# Patient Record
Sex: MALE | Race: WHITE | NOT HISPANIC OR LATINO | Employment: STUDENT | URBAN - METROPOLITAN AREA
[De-identification: names, ages, dates, MRNs, and addresses within clinical notes are randomized per-mention and may not be internally consistent; named-entity substitution may affect disease eponyms.]

---
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
## 2020-01-09 ENCOUNTER — HOSPITAL ENCOUNTER (EMERGENCY)
Facility: HOSPITAL | Age: 20
Discharge: HOME | End: 2020-01-09
Attending: EMERGENCY MEDICINE
Payer: COMMERCIAL

## 2020-01-09 ENCOUNTER — APPOINTMENT (EMERGENCY)
Dept: RADIOLOGY | Facility: HOSPITAL | Age: 20
End: 2020-01-09
Payer: COMMERCIAL

## 2020-01-09 VITALS
HEART RATE: 67 BPM | WEIGHT: 133 LBS | DIASTOLIC BLOOD PRESSURE: 80 MMHG | OXYGEN SATURATION: 99 % | TEMPERATURE: 98.5 F | RESPIRATION RATE: 16 BRPM | SYSTOLIC BLOOD PRESSURE: 135 MMHG

## 2020-01-09 DIAGNOSIS — M54.9 ACUTE BILATERAL BACK PAIN, UNSPECIFIED BACK LOCATION: ICD-10-CM
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
## 2020-01-09 DIAGNOSIS — S09.90XA INJURY OF HEAD, INITIAL ENCOUNTER: ICD-10-CM

## 2020-01-09 PROCEDURE — 99283 EMERGENCY DEPT VISIT LOW MDM: CPT

## 2020-01-09 PROCEDURE — 90471 IMMUNIZATION ADMIN: CPT

## 2020-01-09 PROCEDURE — 96372 THER/PROPH/DIAG INJ SC/IM: CPT

## 2020-01-09 PROCEDURE — 71046 X-RAY EXAM CHEST 2 VIEWS: CPT

## 2020-01-09 PROCEDURE — 90715 TDAP VACCINE 7 YRS/> IM: CPT | Performed by: EMERGENCY MEDICINE

## 2020-01-09 PROCEDURE — 99284 EMERGENCY DEPT VISIT MOD MDM: CPT | Performed by: EMERGENCY MEDICINE

## 2020-01-09 RX ORDER — KETOROLAC TROMETHAMINE 30 MG/ML
30 INJECTION, SOLUTION INTRAMUSCULAR; INTRAVENOUS ONCE
Status: COMPLETED | OUTPATIENT
Start: 2020-01-09 | End: 2020-01-09

## 2020-01-09 RX ORDER — NAPROXEN 500 MG/1
500 TABLET ORAL 2 TIMES DAILY WITH MEALS
Qty: 30 TABLET | Refills: 0 | Status: SHIPPED | OUTPATIENT
Start: 2020-01-09 | End: 2021-04-17

## 2020-01-09 RX ADMIN — KETOROLAC TROMETHAMINE 30 MG: 30 INJECTION, SOLUTION INTRAMUSCULAR at 12:37

## 2020-01-09 RX ADMIN — TETANUS TOXOID, REDUCED DIPHTHERIA TOXOID AND ACELLULAR PERTUSSIS VACCINE, ADSORBED 0.5 ML: 5; 2.5; 8; 8; 2.5 SUSPENSION INTRAMUSCULAR at 12:37

## 2020-01-09 NOTE — DISCHARGE INSTRUCTIONS
Please make sure to follow up with a primary care doctor within 1 week if you continue to have symptoms and still have pain  You can take anti-inflammatories for your muscle pains and apply heat packs to affected areas  If you develop any leg weakness, numbness or tingling, inability to walk, urinary/fecal incontinence/retention, or any other concerning symptoms, please return to the emergency department as these could be signs of worsening illness

## 2021-01-13 ENCOUNTER — TRANSCRIBE ORDERS (OUTPATIENT)
Dept: FAMILY MEDICINE CLINIC | Facility: HOSPITAL | Age: 21
End: 2021-01-13

## 2021-01-13 ENCOUNTER — APPOINTMENT (OUTPATIENT)
Dept: LAB | Facility: CLINIC | Age: 21
End: 2021-01-13
Payer: OTHER GOVERNMENT

## 2021-01-13 ENCOUNTER — OFFICE VISIT (OUTPATIENT)
Dept: DERMATOLOGY | Facility: CLINIC | Age: 21
End: 2021-01-13
Payer: COMMERCIAL

## 2021-01-13 VITALS — TEMPERATURE: 97.1 F | BODY MASS INDEX: 21.83 KG/M2 | WEIGHT: 131 LBS | HEIGHT: 65 IN

## 2021-01-13 DIAGNOSIS — L72.0 EPIDERMAL CYST: ICD-10-CM

## 2021-01-13 DIAGNOSIS — B35.1 ONYCHOMYCOSIS: Primary | ICD-10-CM

## 2021-01-13 DIAGNOSIS — L70.0 ACNE VULGARIS: ICD-10-CM

## 2021-01-13 LAB
ALBUMIN SERPL BCP-MCNC: 3.8 G/DL (ref 3.5–5)
ALP SERPL-CCNC: 68 U/L (ref 46–116)
ALT SERPL W P-5'-P-CCNC: 24 U/L (ref 12–78)
AST SERPL W P-5'-P-CCNC: 16 U/L (ref 5–45)
BILIRUB DIRECT SERPL-MCNC: 0.08 MG/DL (ref 0–0.2)
BILIRUB SERPL-MCNC: 0.2 MG/DL (ref 0.2–1)
PROT SERPL-MCNC: 7 G/DL (ref 6.4–8.2)

## 2021-01-13 PROCEDURE — 87102 FUNGUS ISOLATION CULTURE: CPT | Performed by: STUDENT IN AN ORGANIZED HEALTH CARE EDUCATION/TRAINING PROGRAM

## 2021-01-13 PROCEDURE — 36415 COLL VENOUS BLD VENIPUNCTURE: CPT | Performed by: STUDENT IN AN ORGANIZED HEALTH CARE EDUCATION/TRAINING PROGRAM

## 2021-01-13 PROCEDURE — 80076 HEPATIC FUNCTION PANEL: CPT | Performed by: STUDENT IN AN ORGANIZED HEALTH CARE EDUCATION/TRAINING PROGRAM

## 2021-01-13 PROCEDURE — 99204 OFFICE O/P NEW MOD 45 MIN: CPT | Performed by: STUDENT IN AN ORGANIZED HEALTH CARE EDUCATION/TRAINING PROGRAM

## 2021-01-13 RX ORDER — TERBINAFINE HYDROCHLORIDE 250 MG/1
TABLET ORAL
Qty: 42 TABLET | Refills: 1 | Status: SHIPPED | OUTPATIENT
Start: 2021-01-13 | End: 2021-01-26

## 2021-01-13 NOTE — PATIENT INSTRUCTIONS
1  ONYCHOMYCOSIS ("FUNGAL NAIL")        Assessment and Plan:  Based on a thorough discussion of this condition and the management approach to it (including a comprehensive discussion of the known risks, side effects and potential benefits of treatment), the patient (family) agrees to implement the following specific plan:   Discussed with patient different options of treatments like oral medications different topicals, laser therapy and powders if patient is interested   Recommend patient to keep toe nail short   Will order labs for patient to get prior starting oral medication and the labs should be done again in 6 weeks   Start using prescribed terbinafine 250mg tablet  Take one pill daily with full glass of water   Recommend patient to follow up in 3 months for check up  What are fungal nail infections? Fungal infection of the nails is also known as onychomycosis  It is increasingly common with increased age  It rarely affects children  Which organisms cause onychomycosis? Onychomycosis can be due to:  Dermatophytes such as Trichophyton rubrum (T  rubrum), T  interdigitale (tinea unguium)   Yeasts such as Candida albicans   Molds such as Scopulariopsis brevicaulis and Fusarium species  What are the clinical features of onychomycosis? Onychomycosis may affect one or more toenails and fingernails and most often involves the great toenail or the little toenail  It can present in one or several different patterns   Lateral onychomycosis: a white or yellow opaque streak appears at one side of the nail   Subungual hyperkeratosis: scaling occurs under the nail   Distal onycholysis: the end of the nail lifts  The free edge often crumbles   Superficial white onychomycosis: flaky white patches and pits appear on the top of the nail plate   Proximal onychomycosis:yellow spots appear in the half-moon (lunula)      Onychoma or dermatophytoma:a thick localized area of infection in the nail plate    Destruction of the nail    Tinea unguium often results from untreated tinea pedis (feet) or tinea manuum (hand)  It may follow an injury to the nail or inflammatory disease of the nail  Candida infection of the nail plate generally results from paronychia and starts near the nail fold (the cuticle)  The nail fold is swollen and red, lifted off the nail plate  White, yellow, green or black marks appear on the nearby nail and spread  The nail may lift off its bed and is tender if you press on it  Mold infections are similar in appearance to tinea unguium  Onychomycosis must be distinguished from other nail disorders   Bacterial infection especially Pseudomonas aeruginosa, which turns the nail black or green    Psoriasis    Eczema or dermatitis    Lichen planus    Viral warts    Onycholysis    Onychogryphosis (nail thickening and scaling under the nail), common in the elderly    How is the diagnosis of onychomycosis confirmed? Clippings should be taken from crumbling tissue at the end of the infected nail  The discolored surface of the nails can be scraped off  The debris can be scooped out from under the nail  The clippings and scrapings are sent to a mycology laboratory for microscopy and culture  Previous treatment can reduce the chance of growing the fungus successfully in a culture, so it is best to take the clippings before any treatment is commenced:   To confirm the diagnosis -- antifungal treatment will not be successful if there is another explanation for the nail condition    To identify the responsible organism  Molds and yeasts may require different treatment from dermatophyte fungi   Treatment may be required for a prolonged period and is expensive  Partially treated infection may be impossible to prove for many months as antifungal drugs can be detected even a year later  A nail biopsy may also reveal characteristic histopathological features of onychomycosis      What is the treatment of onychomycosis? Fingernail infections are usually cured more quickly and effectively than toenail infections  Mild infections affecting less than 50% of one or two nails may respond to topical antifungal medications, but cure usually requires an oral antifungal medication for several months  Devices used to treat onychomycosis  Recently, non-drug treatment has been developed to treat onychomycosis thus avoiding the side effects and risks of oral antifungal drugs  Lasers emitting infrared radiation are thought to kill fungi by the production of heat within the infected tissue  Laser treatment is reported to safely eradicate nail fungi with one to three, almost painless, sessions  Several lasers have been approved for this purpose by the FDA and other regulatory authorities  However, high-quality studies of efficacy are lacking, and existing studies indicate that laser treatment is less medically effective than topical or oral antifungal agents  Nd:YAG continuous, long or short-pulsed lasers   Ti:Sapphire modelocked laser   Diode laser          2  EPIDERMAL INCLUSION CYST        Assessment and Plan:  Based on a thorough discussion of this condition and the management approach to it (including a comprehensive discussion of the known risks, side effects and potential benefits of treatment), the patient (family) agrees to implement the following specific plan:   Discussed with patient today about having it excised in the future if interested  What are epidermal inclusion cysts? Epidermal inclusion cysts are the most common, benign cutaneous cysts  There are many different names for epidermal inclusion cysts, including epidermoid cyst, epidermal cyst, infundibular cyst, inclusion cyst, and keratin cyst  These cysts can occur anywhere on the body and typically present as nodules directly underneath the skin  There is often a visible pore or opening in the center   The cysts are freely moveable and can range from a few millimeters to several centimeters in diameter  The center of epidermoid cysts almost always contains keratin, which has a cheesy appearance, and not sebum  They also do not originate from sebaceous glands  Therefore, epidermal inclusion cysts are not the same as sebaceous cysts  Cysts may remain stable or progressively enlarge over time  There are no reliable predictive factors to tell if an epidermal inclusion cyst will enlarge, become inflamed, or remain quiescent  Infected cysts tend to become larger, turn red, and are more noticeable to the patient  There may be accompanying pain and discomfort  What causes epidermal inclusion cysts? Epidermal inclusion cysts often appear out of the blue and are not contagious  They are due to a proliferation of epidermal cells within the dermis and are more common in men than women  They occur more frequently in patients in their 20s to 45s  Epidermal inclusion cysts by themselves are usually not inherited, but they can be hereditary in rare syndromes such as Pittman syndrome, nodular elastosis with cysts and comedones (Favre-Racouchot syndrome), and basal cell nevus syndrome (Gorlin syndrome)  Elderly patients with chronic sun-damaged skin areas have a higher likelihood of developing epidermoid cysts  They often occur in areas where hair follicles have been inflamed or repeatedly irritated are more frequent in patients with acne vulgaris  In the  period, they are called milia  Patients on BRAF inhibitors such as imiquimod and cyclosporine have a higher incidence of epidermoid cysts of the face      How do we diagnose an epidermal inclusion cyst?  Epidermoid inclusion cysts are often diagnosed by history and physical exam  There is usually no need for biopsy prior to removal   Radiographic and laboratory exams, such as ultrasound studies, are unnecessary and not typically ordered unless the practitioner suspects a genetic condition  What is the treatment for an epidermal inclusion cyst?  Inflamed, uninfected epidermal inclusion cysts rarely resolve spontaneously without therapy or surgical intervention  Treatment is not emergent unless desired by the patient  Definitive treatment is via surgical excision with walls intact  This method will prevent recurrence  This is best done when the cyst is not inflamed, to decrease the probability of rupture during surgery  - A local anesthetic will be injected around the cyst  - A small incision is made in the skin overlying the cyst, and contents are expressed  - The incision is repaired with sutures    Another option is to use a 4mm punch biopsy with cyst extraction through the defect  Incision and drainage is often needed if the cyst is infected or inflamed  If there is surrounding cellulitis, oral antibiotic therapy may be necessary  The common agents used target methicillin sensitive Staphylococcal aureus and methicillin resistant S aureus in areas of high prevalence

## 2021-01-13 NOTE — PROGRESS NOTES
[address] [facility] Note     Patient Name: Amira Singleton  Encounter Date: 01/13/2021     Have you been cared for by a [facility] Dermatologist in the last 3 years and, if so, which one? No    · Have you traveled outside of the [address] [place] [address] in the past 3 months or outside of the [facility] area in the last 2 weeks? No     May we call your Preferred Phone number to discuss your specific medical information? Yes     May we leave a detailed message that includes your specific medical information? Yes      Today's Chief Concerns:   Concern #1:  Nail fungus    Concern #2: Acne     Past Medical History:  Have you personally ever had or currently have any of the following? · Skin cancer (such as Melanoma, Basal Cell Carcinoma, Squamous Cell Carcinoma? (If Yes, please provide more detail)- No  · Eczema: No  · Psoriasis: No  · HIV/AIDS: No  · Hepatitis B or C: No  · Tuberculosis: No  · Systemic Immunosuppression such as Diabetes, Biologic or Immunotherapy, Chemotherapy, Organ Transplantation, Bone Marrow Transplantation (If YES, please provide more detail): No  · Radiation Treatment (If YES, please provide more detail): No  · Any other major medical conditions/concerns? (If Yes, which types)- YES, a arotic stenosis     Social History:     What is/was your primary occupation? n/a     What are your hobbies/past-times? Golfing     Family History:  Have any of your "first degree relatives" (parent, brother, sister, or child) had any of the following       · Skin cancer such as Melanoma or Merkel Cell Carcinoma or Pancreatic Cancer? No  · Eczema, Asthma, Hay Fever or Seasonal Allergies: YES, seasonal  allergies   · Psoriasis or Psoriatic Arthritis: No  · Do any other medical conditions seem to run in your family? If Yes, what condition and which relatives?   YES, lukemia - cousin    Current Medications:   (please update all dermatological medications before printing patient's AVS!)      Current Outpatient Medications:     fluocinonide (LIDEX) 0 05 % cream, Apply 1 application topically 2 (two) times a day for 7 days, Disp: 30 g, Rfl: 0    naproxen (NAPROSYN) 500 mg tablet, Take 1 tablet (500 mg total) by mouth 2 (two) times a day with meals (Patient not taking: Reported on 1/13/2021), Disp: 30 tablet, Rfl: 0    predniSONE 10 mg tablet, Take 6 tabs PO X2d, followed by 5 tabs PO X2d, 4 tabs PO X2d, 3 tabs PO X2d, 2 tabs PO X2d & 1 tab PO X2d (Patient not taking: Reported on 1/13/2021), Disp: 42 tablet, Rfl: 0      Review of Systems:  Have you recently had or currently have any of the following? If YES, what are you doing for the problem? · Fever, chills or unintended weight loss: No  · Sudden loss or change in your vision: No  · Nausea, vomiting or blood in your stool: No  · Painful or swollen joints: No  · Wheezing or cough: No  · Changing mole or non-healing wound: YES, cyst on back area present for years  · Nosebleeds: No  · Excessive sweating: No  · Easy or prolonged bleeding? YES,   · Over the last 2 weeks, how often have you been bothered by the following problems? · Taking little interest or pleasure in doing things: 1 - Not at All  · Feeling down, depressed, or hopeless: 1 - Not at All  · Rapid heartbeat with epinephrine:  No        · Any known allergies? No Known Allergies      Physical Exam:     Was a chaperone (Derm Clinical Assistant) present throughout the entire Physical Exam? Yes     Did the Dermatology Team specifically  the patient on the importance of a Full Skin Exam to be sure that nothing is missed clinically?  Yes}  o Did the patient ultimately request or accept a Full Skin Exam?  NO  o Did the patient specifically refuse to have the areas "under-the-bra" examined by the Dermatologist? Jomar lopez Did the patient specifically refuse to have the areas "under-the-underwear" examined by the Dermatologist? YES    CONSTITUTIONAL:   Vitals:    01/13/21 0851 Temp: (!) 97 1 °F (36 2 °C)   TempSrc: Tympanic   Weight: 59 4 kg (131 lb)   Height: 5' 5" (1 651 m)           PSYCH: Normal mood and affect  EYES: Normal conjunctiva  ENT: Normal lips and oral mucosa  CARDIOVASCULAR: No edema  RESPIRATORY: Normal respirations  HEME/LYMPH/IMMUNO:  No regional lymphadenopathy except as noted below in "ASSESSMENT AND PLAN BY DIAGNOSIS"    SKIN:  FOCUSED ORGAN SYSTEM EXAM   Hair, Face Normal except as noted below in Assessment   Neck Normal except as noted below in Assessment   Groin/Genitalia/Buttocks   Right Foot, Toes   Left  Foot, Toes    NOT EXAMINED   Normal except as noted below in Assessment   Normal except as noted below in Assessment          Assessment and Plan by Diagnosis:    History of Present Condition:     Duration:  How long has this been an issue for you?    o  2 years    Location Affected:  Where on the body is this affecting you?    o  left foot    Quality:  Is there any bleeding, pain, itch, burning/irritation, or redness associated with the skin lesion? o  some pain from walking long period of times   Severity:  Describe any bleeding, pain, itch, burning/irritation, or redness on a scale of 1 to 10 (with 10 being the worst)  o  n/a   Timing:  Does this condition seem to be there pretty constantly or do you notice it more at specific times throughout the day? o  constantly there    Context:  Have you ever noticed that this condition seems to be associated with specific activities you do?     o  wearing sneakers for long periods - hiking and walking long periods     Modifying Factors:    o Anything that seems to make the condition worse?    - wearing sneakers for long periods - hiking and walking long periods*   o What have you tried to do to make the condition better?    -  white vinegar, fluconazole topical solution,      Associated Signs and Symptoms:  Does this skin lesion seem to be associated with any of the following:  o  Slight pain 1  ONYCHOMYCOSIS ("FUNGAL NAIL")    Physical Exam:   Anatomic Location Affected:  Left foot, toe #1-3    Morphological Description:  Dystrophic nails with hyperkeratotic debris    Pertinent Positives:   Pertinent Negatives: no e/o tinea pedis    Additional History of Present Condition:  Present for years  Patient has tried in the past topical cream and home remedies  Currently using ciclopirox  Asks about other available treatments  Has had foot fungus as well in past, resolved with topicals OTC  Assessment and Plan:  Based on a thorough discussion of this condition and the management approach to it (including a comprehensive discussion of the known risks, side effects and potential benefits of treatment), the patient (family) agrees to implement the following specific plan:   Discussed with patient different options of treatments   Will plan to start terbinafine 250mg tablet, Take one pill daily with full glass of water  For 12 week course   Discussed expected timeline of growth of toenails and expected improvemnt with treatment   Will order labs for patient to get prior to starting oral medication and then patient should repeat labs in 6 weeks-    Obtained a fungal nail culture today   Patient to follow up in 3 months for check up  What are fungal nail infections? Fungal infection of the nails is also known as onychomycosis  It is increasingly common with increased age  It rarely affects children  Which organisms cause onychomycosis? Onychomycosis can be due to:  Dermatophytes such as Trichophyton rubrum (T  rubrum), T  interdigitale (tinea unguium)   Yeasts such as Candida albicans   Molds such as Scopulariopsis brevicaulis and Fusarium species  What are the clinical features of onychomycosis? Onychomycosis may affect one or more toenails and fingernails and most often involves the great toenail or the little toenail   It can present in one or several different patterns   Lateral onychomycosis: a white or yellow opaque streak appears at one side of the nail   Subungual hyperkeratosis: scaling occurs under the nail   Distal onycholysis: the end of the nail lifts  The free edge often crumbles   Superficial white onychomycosis: flaky white patches and pits appear on the top of the nail plate   Proximal onychomycosis:yellow spots appear in the half-moon (lunula)   Onychoma or dermatophytoma:a thick localized area of infection in the nail plate    Destruction of the nail    Tinea unguium often results from untreated tinea pedis (feet) or tinea manuum (hand)  It may follow an injury to the nail or inflammatory disease of the nail  Candida infection of the nail plate generally results from paronychia and starts near the nail fold (the cuticle)  The nail fold is swollen and red, lifted off the nail plate  White, yellow, green or black marks appear on the nearby nail and spread  The nail may lift off its bed and is tender if you press on it  Mold infections are similar in appearance to tinea unguium  Onychomycosis must be distinguished from other nail disorders   Bacterial infection especially Pseudomonas aeruginosa, which turns the nail black or green    Psoriasis    Eczema or dermatitis    Lichen planus    Viral warts    Onycholysis    Onychogryphosis (nail thickening and scaling under the nail), common in the elderly    How is the diagnosis of onychomycosis confirmed? Clippings should be taken from crumbling tissue at the end of the infected nail  The discolored surface of the nails can be scraped off  The debris can be scooped out from under the nail  The clippings and scrapings are sent to a mycology laboratory for microscopy and culture    Previous treatment can reduce the chance of growing the fungus successfully in a culture, so it is best to take the clippings before any treatment is commenced:   To confirm the diagnosis -- antifungal treatment will not be successful if there is another explanation for the nail condition    To identify the responsible organism  Molds and yeasts may require different treatment from dermatophyte fungi   Treatment may be required for a prolonged period and is expensive  Partially treated infection may be impossible to prove for many months as antifungal drugs can be detected even a year later  A nail biopsy may also reveal characteristic histopathological features of onychomycosis  What is the treatment of onychomycosis? Fingernail infections are usually cured more quickly and effectively than toenail infections  Mild infections affecting less than 50% of one or two nails may respond to topical antifungal medications, but cure usually requires an oral antifungal medication for several months  Devices used to treat onychomycosis  Recently, non-drug treatment has been developed to treat onychomycosis thus avoiding the side effects and risks of oral antifungal drugs  Lasers emitting infrared radiation are thought to kill fungi by the production of heat within the infected tissue  Laser treatment is reported to safely eradicate nail fungi with one to three, almost painless, sessions  Several lasers have been approved for this purpose by the FDA and other regulatory authorities  However, high-quality studies of efficacy are lacking, and existing studies indicate that laser treatment is less medically effective than topical or oral antifungal agents  Nd:YAG continuous, long or short-pulsed lasers   Ti:Sapphire modelocked laser   Diode laser          2  EPIDERMAL CYST, pilonidal cyst given location     Physical Exam:   Anatomic Location Affected:  Upper gluteal cleft   Morphological Description:  6mm pink to skin colored papule, mobile    Pertinent Positives:   Pertinent Negatives: No visible scarred tracks    Additional History of Present Condition:  Present for years   Patient notes he get pain on lower back whenever he does sit ups  Has never gotten large, swollen, does not drain  Assessment and Plan:  Based on a thorough discussion of this condition and the management approach to it (including a comprehensive discussion of the known risks, side effects and potential benefits of treatment), the patient (family) agrees to implement the following specific plan:   Given size, discussed with patient today about having it excised with us in the future if interested   Please call office to schedule excision with Dr Brandon Wilson either in [place] or [facility]  What are epidermal inclusion cysts? Epidermal inclusion cysts are the most common, benign cutaneous cysts  There are many different names for epidermal inclusion cysts, including epidermoid cyst, epidermal cyst, infundibular cyst, inclusion cyst, and keratin cyst  These cysts can occur anywhere on the body and typically present as nodules directly underneath the skin  There is often a visible pore or opening in the center  The cysts are freely moveable and can range from a few millimeters to several centimeters in diameter  The center of epidermoid cysts almost always contains keratin, which has a cheesy appearance, and not sebum  They also do not originate from sebaceous glands  Therefore, epidermal inclusion cysts are not the same as sebaceous cysts  Cysts may remain stable or progressively enlarge over time  There are no reliable predictive factors to tell if an epidermal inclusion cyst will enlarge, become inflamed, or remain quiescent  Infected cysts tend to become larger, turn red, and are more noticeable to the patient  There may be accompanying pain and discomfort  What causes epidermal inclusion cysts? Epidermal inclusion cysts often appear out of the blue and are not contagious  They are due to a proliferation of epidermal cells within the dermis and are more common in men than women   They occur more frequently in patients in their 25s to 45s  Epidermal inclusion cysts by themselves are usually not inherited, but they can be hereditary in rare syndromes such as Pittman syndrome, nodular elastosis with cysts and comedones (Favre-Racouchot syndrome), and basal cell nevus syndrome (Gorlin syndrome)  Elderly patients with chronic sun-damaged skin areas have a higher likelihood of developing epidermoid cysts  They often occur in areas where hair follicles have been inflamed or repeatedly irritated are more frequent in patients with acne vulgaris  In the  period, they are called milia  Patients on BRAF inhibitors such as imiquimod and cyclosporine have a higher incidence of epidermoid cysts of the face  How do we diagnose an epidermal inclusion cyst?  Epidermoid inclusion cysts are often diagnosed by history and physical exam  There is usually no need for biopsy prior to removal   Radiographic and laboratory exams, such as ultrasound studies, are unnecessary and not typically ordered unless the practitioner suspects a genetic condition  What is the treatment for an epidermal inclusion cyst?  Inflamed, uninfected epidermal inclusion cysts rarely resolve spontaneously without therapy or surgical intervention  Treatment is not emergent unless desired by the patient  Definitive treatment is via surgical excision with walls intact  This method will prevent recurrence  This is best done when the cyst is not inflamed, to decrease the probability of rupture during surgery  - A local anesthetic will be injected around the cyst  - A small incision is made in the skin overlying the cyst, and contents are expressed  - The incision is repaired with sutures    Another option is to use a 4mm punch biopsy with cyst extraction through the defect  Incision and drainage is often needed if the cyst is infected or inflamed  If there is surrounding cellulitis, oral antibiotic therapy may be necessary   The common agents used target methicillin sensitive Staphylococcal aureus and methicillin resistant S aureus in areas of high prevalence  ACNE VULGARIS ("COMMON ACNE")    Physical Exam:   Anatomic Location Affected: face   Morphological Description: few small inflammatory papules    Additional History of Present Condition:  Pt has used benzaclin in past and recently ran out was doing well on this and asks for refill    Assessment and Plan:   We reviewed the causes of acne, the kinds of acne, and the expected clinical course   We discussed treatment options ranging from over-the-counter products, topical retinoids, antibiotics, BP, hormonal therapies (OCPs/spironolactone), and isotretinoin (Accutane)   We reviewed specific over-the-counter interventions and medications  Recommended typical hygiene measures washing regularly with mild cleanser, and refraining from picking and popping any pimples  Recommended non-comedogenic sunscreen use daily   Expectations of therapy discussed  Side effects, risks and benefits of medications discussed  A comprehensive handout with treatment plan provided  The phone number to call in case of questions or concerns (and instructions to stop medications in such a scenario) was provided   After lengthy discussion of etiology and treatment options, we decided to implement the following personalized treatment plan:      Mornin  Wash with over the counter Cerave cleanser  2  Apply BENZACLIN lotion to entire affected area   3  Follow with an oil-free sunscreen (SPF 30 or higher)  Some options include Neutrogena oil-free moisturizer with SPF     Night:    1  Wash your face with a gentle face wash - like Cetaphil wash, Cerave Hydrating , LaRoche Hydrating Cleanser   2  Apply an oil free moisturizer     Make sure all products you use on face are labeled as "non-comedongenic" or "oil-free" or " does not clog pores"  Acne can be frustrating and difficult to treat   Most acne regimens take 2-3 months to see an improvement, so stick with them  Don't give up! As always, call your doctor if you have any concerns about your medications              Scribe Attestation    I,:  Maria R Cota MA am acting as a scribe while in the presence of the attending physician :       I,:  Leonor Jaimes MD personally performed the services described in this documentation    as scribed in my presence :

## 2021-01-13 NOTE — RESULT ENCOUNTER NOTE
Called patient to advise of normal results and okay to start terbinafine 250 mg daily  Will recheck labs in 6 weeks

## 2021-01-26 ENCOUNTER — TELEPHONE (OUTPATIENT)
Dept: DERMATOLOGY | Facility: CLINIC | Age: 21
End: 2021-01-26

## 2021-01-26 RX ORDER — CLINDAMYCIN AND BENZOYL PEROXIDE 10; 50 MG/G; MG/G
GEL TOPICAL 2 TIMES DAILY
Qty: 50 G | Refills: 5 | Status: SHIPPED | OUTPATIENT
Start: 2021-01-26 | End: 2021-04-17

## 2021-01-26 RX ORDER — TERBINAFINE HYDROCHLORIDE 250 MG/1
250 TABLET ORAL DAILY
Qty: 54 TABLET | Refills: 0 | Status: SHIPPED | OUTPATIENT
Start: 2021-01-26 | End: 2021-02-22

## 2021-01-26 RX ORDER — CLINDAMYCIN AND BENZOYL PEROXIDE 10; 50 MG/G; MG/G
GEL TOPICAL 2 TIMES DAILY
Qty: 50 G | Refills: 5 | Status: SHIPPED | OUTPATIENT
Start: 2021-01-26 | End: 2021-01-26

## 2021-01-26 NOTE — TELEPHONE ENCOUNTER
Called pt to discuss  Pt was only given 30 pills of terbinafine instead of 42 (which was to be 1/2 way through course) Confused about labs and medications  Sent new Rx to new pharmacy as pt requested for 54 tablets that he needs to finish out 84 days of medication  Instructed he needs labs at day 43 (1/2 way through course)  He voiced understanding  Rx's sent under initial encounter date

## 2021-02-15 LAB — FUNGUS SPEC CULT: NORMAL

## 2021-02-19 ENCOUNTER — TELEPHONE (OUTPATIENT)
Dept: OTHER | Facility: OTHER | Age: 21
End: 2021-02-19

## 2021-02-22 RX ORDER — TERBINAFINE HYDROCHLORIDE 250 MG/1
250 TABLET ORAL DAILY
Qty: 30 TABLET | Refills: 0 | Status: SHIPPED | OUTPATIENT
Start: 2021-02-22 | End: 2021-03-24

## 2021-02-22 NOTE — TELEPHONE ENCOUNTER
Called to discuss- he notes he is able to get it Thursday  Oka to continue terbinafine  Again notes pharmacy couldn't fill 42 tablets- sent 30 additional tablets to complete 12 weeks course

## 2021-02-24 NOTE — TELEPHONE ENCOUNTER
Thank you  I started [employer] today for clindamycin phos-Benzoyl Perox 1-5% Gel  LVM notifying him once it's approved patient will be called

## 2021-02-26 DIAGNOSIS — L70.0 ACNE VULGARIS: Primary | ICD-10-CM

## 2021-02-26 RX ORDER — CLINDAMYCIN PHOSPHATE AND BENZOYL PEROXIDE 10; 50 MG/G; MG/G
GEL TOPICAL
Qty: 45 G | Refills: 6 | Status: CANCELLED | OUTPATIENT
Start: 2021-02-26

## 2021-02-26 NOTE — TELEPHONE ENCOUNTER
PA was denied  Insurance suggest to try [facility]) clindamycin- benzol peroxide 1 2 - 5% Gel first    Please review order before signing         Patient's VM is full, not able to notify patient of denial

## 2021-03-23 ENCOUNTER — TELEPHONE (OUTPATIENT)
Dept: DERMATOLOGY | Facility: CLINIC | Age: 21
End: 2021-03-23

## 2021-04-17 ENCOUNTER — APPOINTMENT (EMERGENCY)
Dept: ULTRASOUND IMAGING | Facility: HOSPITAL | Age: 21
End: 2021-04-17
Payer: OTHER GOVERNMENT

## 2021-04-17 ENCOUNTER — APPOINTMENT (EMERGENCY)
Dept: RADIOLOGY | Facility: HOSPITAL | Age: 21
End: 2021-04-17
Payer: OTHER GOVERNMENT

## 2021-04-17 ENCOUNTER — HOSPITAL ENCOUNTER (EMERGENCY)
Facility: HOSPITAL | Age: 21
Discharge: HOME | End: 2021-04-17
Attending: EMERGENCY MEDICINE | Admitting: EMERGENCY MEDICINE
Payer: OTHER GOVERNMENT

## 2021-04-17 VITALS
WEIGHT: 130 LBS | HEART RATE: 63 BPM | DIASTOLIC BLOOD PRESSURE: 65 MMHG | BODY MASS INDEX: 20.89 KG/M2 | SYSTOLIC BLOOD PRESSURE: 119 MMHG | HEIGHT: 66 IN | OXYGEN SATURATION: 99 % | RESPIRATION RATE: 16 BRPM | TEMPERATURE: 98.5 F

## 2021-04-17 DIAGNOSIS — R19.09 INGUINAL SWELLING: Primary | ICD-10-CM

## 2021-04-17 PROCEDURE — 99284 EMERGENCY DEPT VISIT MOD MDM: CPT | Performed by: EMERGENCY MEDICINE

## 2021-04-17 PROCEDURE — 73130 X-RAY EXAM OF HAND: CPT

## 2021-04-17 PROCEDURE — 93971 EXTREMITY STUDY: CPT

## 2021-04-17 PROCEDURE — 99284 EMERGENCY DEPT VISIT MOD MDM: CPT

## 2021-04-17 RX ORDER — TERBINAFINE HYDROCHLORIDE 250 MG/1
250 TABLET ORAL DAILY
COMMUNITY
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
## 2021-04-18 PROCEDURE — 93971 EXTREMITY STUDY: CPT | Performed by: SURGERY

## 2021-05-24 ENCOUNTER — OFFICE VISIT (OUTPATIENT)
Dept: DERMATOLOGY | Facility: CLINIC | Age: 21
End: 2021-05-24
Payer: OTHER GOVERNMENT

## 2021-05-24 VITALS — TEMPERATURE: 98.6 F | BODY MASS INDEX: 20.9 KG/M2 | WEIGHT: 130.07 LBS | HEIGHT: 66 IN

## 2021-05-24 DIAGNOSIS — L70.0 ACNE VULGARIS: Primary | ICD-10-CM

## 2021-05-24 DIAGNOSIS — B35.1 ONYCHOMYCOSIS: ICD-10-CM

## 2021-05-24 PROCEDURE — 99213 OFFICE O/P EST LOW 20 MIN: CPT | Performed by: STUDENT IN AN ORGANIZED HEALTH CARE EDUCATION/TRAINING PROGRAM

## 2021-05-24 NOTE — PATIENT INSTRUCTIONS
ACNE VULGARIS ("COMMON ACNE")  POST INFLAMMATORY ERYTHMA AND HYPERPIGMENTATION    Assessment and Plan:   We reviewed the causes of acne, the kinds of acne, and the expected clinical course   We discussed treatment options ranging from over-the-counter products, topical retinoids, antibiotics, BP, hormonal therapies (OCPs/spironolactone), and isotretinoin (Accutane)   We reviewed specific over-the-counter interventions and medications  Recommended typical hygiene measures washing regularly with mild cleanser, and refraining from picking and popping any pimples  Recommended non-comedogenic sunscreen use daily   Expectations of therapy discussed  Side effects, risks and benefits of medications discussed  A comprehensive handout with treatment plan provided  The phone number to call in case of questions or concerns (and instructions to stop medications in such a scenario) was provided   Counseled patient on treatment options  After lengthy discussion of etiology and treatment options, we decided to implement/continue the following personalized treatment plan  All adjustments from prior treatments highlighted below   Discussed fading away of post inflammatory erythema and hyperpigmentation and that good sun protection and topical tretinoin would help with this  Mornin  Wash your face with a gentle face wash - like Cetaphil wash, Cerave Hydrating , LaRoche Hydrating Cleanser   2  Continue Benzaclin lotion to entire affected area   3  Follow with an oil-free sunscreen (SPF 30 or higher)  Some options include Neutrogena oil-free moisturizer with SPF         Night:    1  Wash your face with a gentle face wash - like Cetaphil wash, Cerave Hydrating , LaRoche Hydrating Cleanser   2  (New medication) Pat dry your face, wait 15 mins and then apply a pea-sized amount of over the counter Differin Gel or LaRoche Adapalene 0 1% gel - an even thin layer on your face  Can be drying   Start by using every other night and then build it up to every night  Avoid the eye area and the curves around your nose and corners of your lips  - If too irritating, keep using spaced out to every other night or every 3rd night and slowly increase frequency of use to nightly  Can also use on back and chest if you have these areas involved with acne  - If you were prescribed a prescription strength version and this is not covered by   3  Follow with an oil free moisturizer on top of this medicine to combat the dryness  Homeopathic Options:   Can trial over the counter Supplements for inflammatory acne include Zinc- 40mg daily OR Niacinamide 500mg twice daily  There is possible belly upset a/w these  Make sure all products you use on face are labeled as "non-comedongenic" or "oil-free" or " does not clog pores"  Acne can be frustrating and difficult to treat  Most acne regimens take 2-3 months to see an improvement, so stick with them  Don't give up! As always, call your doctor if you have any concerns about your medications  Assessment and Plan:  Based on a thorough discussion of this condition and the management approach to it (including a comprehensive discussion of the known risks, side effects and potential benefits of treatment), the patient (family) agrees to implement the following specific plan:   You can start to cut your nail with nail clippers  Cut the nails first with no  toenail fungus, then Clean the nail clipper with alcohol before cutting the big toe  ·  Start to use over the counter anitfungal powder Zeasorb         Follow up in 3 months; Patient will call around mid July/end of July  What are fungal nail infections? Fungal infection of the nails is also known as onychomycosis  It is increasingly common with increased age  It rarely affects children  Which organisms cause onychomycosis?   Onychomycosis can be due to:  Dermatophytes such as Trichophyton rubrum (T  rubrum), T  interdigitale (tinea unguium)   Yeasts such as Candida albicans   Molds such as Scopulariopsis brevicaulis and Fusarium species  What are the clinical features of onychomycosis? Onychomycosis may affect one or more toenails and fingernails and most often involves the great toenail or the little toenail  It can present in one or several different patterns   Lateral onychomycosis: a white or yellow opaque streak appears at one side of the nail   Subungual hyperkeratosis: scaling occurs under the nail   Distal onycholysis: the end of the nail lifts  The free edge often crumbles   Superficial white onychomycosis: flaky white patches and pits appear on the top of the nail plate   Proximal onychomycosis:yellow spots appear in the half-moon (lunula)   Onychoma or dermatophytoma:a thick localized area of infection in the nail plate    Destruction of the nail    Tinea unguium often results from untreated tinea pedis (feet) or tinea manuum (hand)  It may follow an injury to the nail or inflammatory disease of the nail  Candida infection of the nail plate generally results from paronychia and starts near the nail fold (the cuticle)  The nail fold is swollen and red, lifted off the nail plate  White, yellow, green or black marks appear on the nearby nail and spread  The nail may lift off its bed and is tender if you press on it  Mold infections are similar in appearance to tinea unguium  Onychomycosis must be distinguished from other nail disorders   Bacterial infection especially Pseudomonas aeruginosa, which turns the nail black or green    Psoriasis    Eczema or dermatitis    Lichen planus    Viral warts    Onycholysis    Onychogryphosis (nail thickening and scaling under the nail), common in the elderly    How is the diagnosis of onychomycosis confirmed? Clippings should be taken from crumbling tissue at the end of the infected nail  The discolored surface of the nails can be scraped off  The debris can be scooped out from under the nail  The clippings and scrapings are sent to a mycology laboratory for microscopy and culture  Previous treatment can reduce the chance of growing the fungus successfully in a culture, so it is best to take the clippings before any treatment is commenced:   To confirm the diagnosis -- antifungal treatment will not be successful if there is another explanation for the nail condition    To identify the responsible organism  Molds and yeasts may require different treatment from dermatophyte fungi   Treatment may be required for a prolonged period and is expensive  Partially treated infection may be impossible to prove for many months as antifungal drugs can be detected even a year later  A nail biopsy may also reveal characteristic histopathological features of onychomycosis  What is the treatment of onychomycosis? Fingernail infections are usually cured more quickly and effectively than toenail infections  Mild infections affecting less than 50% of one or two nails may respond to topical antifungal medications, but cure usually requires an oral antifungal medication for several months  Devices used to treat onychomycosis  Recently, non-drug treatment has been developed to treat onychomycosis thus avoiding the side effects and risks of oral antifungal drugs  Lasers emitting infrared radiation are thought to kill fungi by the production of heat within the infected tissue  Laser treatment is reported to safely eradicate nail fungi with one to three, almost painless, sessions  Several lasers have been approved for this purpose by the FDA and other regulatory authorities  However, high-quality studies of efficacy are lacking, and existing studies indicate that laser treatment is less medically effective than topical or oral antifungal agents    Nd:YAG continuous, long or short-pulsed lasers   Ti:Sapphire modelocked laser   Diode laser

## 2021-05-24 NOTE — PROGRESS NOTES
[facility] Follow Up Note    Patient Name: Alexa Mckeon  Encounter Date: 5/24/2021    Today's Chief Concerns:  [facility] Concern #1:  Follow up fungus on toe   Concern #2: Acne       Current Medications:    Current Outpatient Medications:     fluocinonide (LIDEX) 0 05 % cream, Apply 1 application topically 2 (two) times a day for 7 days, Disp: 30 g, Rfl: 0    terbinafine (LamISIL) 250 mg tablet, Take 250 mg by mouth daily, Disp: , Rfl:     CONSTITUTIONAL:   There were no vitals filed for this visit  Specific Alerts:    Have you been seen by a [facility] Dermatologist in the last 3 years? YES      No Known Allergies    May we call your Preferred Phone number to discuss your specific medical information? YES    May we leave a detailed message that includes your specific medical information? YES    Have you traveled outside of the [facility] in the past 3 months? No    Do you currently have a pacemaker or defibrillator? No    Do you have any artificial heart valves, joints, plates, screws, rods, stents, pins, etc? No   - If Yes, were any placed within the last 2 years? Do you require any medications prior to a surgical procedure? No    Are you taking any medications that cause you to bleed more easily ("blood thinners") No    Have you ever experienced a rapid heartbeat with epinephrine? No    Have you ever been treated with "gold" (gold sodium thiomalate) therapy? No    [facility] can help with wrinkles, "laugh lines," facial volume loss, "double chin," "love handles," age spots, and more  Are you interested in learning today about some of the skin enhancement procedures that we offer? (If Yes, please provide more detail) No    Review of Systems:  Have you recently had or currently have any of the following?     · Fever or chills: No  · Night Sweats: No  · Headaches: No  · Weight Gain: No  · Weight Loss: No  · Blurry Vision: No  · Nausea: No  · Vomiting: No  · Diarrhea: No  · Blood in Stool: No  · Abdominal Pain: No  · Itchy Skin: No  · Painful Joints: No  · Swollen Joints: No  · Muscle Pain: No  · Irregular Mole: No  · Sun Burn: YES  · Dry Skin: YES  · Skin Color Changes: No  · Scar or Keloid: No  · Cold Sores/Fever Blisters: No  · Bacterial Infections/MRSA: No  · Anxiety: No  · Depression: No  · Suicidal or Homicidal Thoughts: No      PSYCH: Normal mood and affect  EYES: Normal conjunctiva  ENT: Normal lips and oral mucosa  CARDIOVASCULAR: No edema  RESPIRATORY: Normal respirations  HEME/LYMPH/IMMUNO:  No regional lymphadenopathy except as noted below in ASSESSMENT AND PLAN BY DIAGNOSIS    SKIN:  FOCUSED ORGAN SYSTEM EXAM   Hair, Ears, Face Normal except as noted below in Assessment   Neck, Chest, Back Normal except as noted below in Assessment     Right Leg, Foot, Toes Normal except as noted below in Assessment   Left Leg, Foot, Toes Normal except as noted below in Assessment     1  ONYCHOMYCOSIS ("FUNGAL NAIL")    Physical Exam:   Anatomic Location Affected: Left first great toe   Morphological Description:  Healthy nail proximally with distal dystrophic roseann plate     Additional History of Present Condition:  Present for years  Patient had tried in the past topical cream and home remedies  Was given terbinafine 250 mg and completed ~3month course; Patient states today that the toe fungus has gotten a lot better  Assessment and Plan:  Based on a thorough discussion of this condition and the management approach to it (including a comprehensive discussion of the known risks, side effects and potential benefits of treatment), the patient (family) agrees to implement the following specific plan:   Reassured can start to cut your nail with nail clippers  Advised to clean the nail clipper with alcohol after using    ·  Start to use over the counter anitfungal powder Zeasorb or OTC terbinafine cream (lamisil) to prevent re-infection  · Discussed slow growth of toenails but should expect contd healthy growth       Follow up in 3 months for acne, can also look at toenails; Patient will call around mid July/end of July  What are fungal nail infections? Fungal infection of the nails is also known as onychomycosis  It is increasingly common with increased age  It rarely affects children  Which organisms cause onychomycosis? Onychomycosis can be due to:  Dermatophytes such as Trichophyton rubrum (T  rubrum), T  interdigitale (tinea unguium)   Yeasts such as Candida albicans   Molds such as Scopulariopsis brevicaulis and Fusarium species  What are the clinical features of onychomycosis? Onychomycosis may affect one or more toenails and fingernails and most often involves the great toenail or the little toenail  It can present in one or several different patterns   Lateral onychomycosis: a white or yellow opaque streak appears at one side of the nail   Subungual hyperkeratosis: scaling occurs under the nail   Distal onycholysis: the end of the nail lifts  The free edge often crumbles   Superficial white onychomycosis: flaky white patches and pits appear on the top of the nail plate   Proximal onychomycosis:yellow spots appear in the half-moon (lunula)   Onychoma or dermatophytoma:a thick localized area of infection in the nail plate    Destruction of the nail    Tinea unguium often results from untreated tinea pedis (feet) or tinea manuum (hand)  It may follow an injury to the nail or inflammatory disease of the nail  Candida infection of the nail plate generally results from paronychia and starts near the nail fold (the cuticle)  The nail fold is swollen and red, lifted off the nail plate  White, yellow, green or black marks appear on the nearby nail and spread  The nail may lift off its bed and is tender if you press on it  Mold infections are similar in appearance to tinea unguium    Onychomycosis must be distinguished from other nail disorders   Bacterial infection especially Pseudomonas aeruginosa, which turns the nail black or green    Psoriasis    Eczema or dermatitis    Lichen planus    Viral warts    Onycholysis    Onychogryphosis (nail thickening and scaling under the nail), common in the elderly    How is the diagnosis of onychomycosis confirmed? Clippings should be taken from crumbling tissue at the end of the infected nail  The discolored surface of the nails can be scraped off  The debris can be scooped out from under the nail  The clippings and scrapings are sent to a mycology laboratory for microscopy and culture  Previous treatment can reduce the chance of growing the fungus successfully in a culture, so it is best to take the clippings before any treatment is commenced:   To confirm the diagnosis -- antifungal treatment will not be successful if there is another explanation for the nail condition    To identify the responsible organism  Molds and yeasts may require different treatment from dermatophyte fungi   Treatment may be required for a prolonged period and is expensive  Partially treated infection may be impossible to prove for many months as antifungal drugs can be detected even a year later  A nail biopsy may also reveal characteristic histopathological features of onychomycosis  What is the treatment of onychomycosis? Fingernail infections are usually cured more quickly and effectively than toenail infections  Mild infections affecting less than 50% of one or two nails may respond to topical antifungal medications, but cure usually requires an oral antifungal medication for several months  Devices used to treat onychomycosis  Recently, non-drug treatment has been developed to treat onychomycosis thus avoiding the side effects and risks of oral antifungal drugs  Lasers emitting infrared radiation are thought to kill fungi by the production of heat within the infected tissue   Laser treatment is reported to safely eradicate nail fungi with one to three, almost painless, sessions  Several lasers have been approved for this purpose by the FDA and other regulatory authorities  However, high-quality studies of efficacy are lacking, and existing studies indicate that laser treatment is less medically effective than topical or oral antifungal agents  Nd:YAG continuous, long or short-pulsed lasers   Ti:Sapphire modelocked laser   Diode laser    2  ACNE VULGARIS ("COMMON ACNE")  POST INFLAMMATORY ERYTHMA AND HYPERPIGMENTATION    Physical Exam:   Anatomic Location Affected & Morphological Description:  face,  with scattered open/closed comedones, inflammatory papules, pustules, and nodules with erythematous and tan macules within affected areas    Additional History of Present Condition:  Present for several years  Patient notes he get pain on lower back whenever he does sit up  Has never gotten large, swollen, does not drain  Assessment and Plan:   We reviewed the causes of acne, the kinds of acne, and the expected clinical course   We discussed treatment options ranging from over-the-counter products, topical retinoids, antibiotics, BP, hormonal therapies (OCPs/spironolactone), and isotretinoin (Accutane)   We reviewed specific over-the-counter interventions and medications  Recommended typical hygiene measures washing regularly with mild cleanser, and refraining from picking and popping any pimples  Recommended non-comedogenic sunscreen use daily   Expectations of therapy discussed  Side effects, risks and benefits of medications discussed  A comprehensive handout with treatment plan provided  The phone number to call in case of questions or concerns (and instructions to stop medications in such a scenario) was provided   Counseled patient on treatment options  After lengthy discussion of etiology and treatment options, we decided to implement/continue the following personalized treatment plan   All adjustments from prior treatments highlighted below   Discussed fading away of post inflammatory erythema and hyperpigmentation and that good sun protection and topical tretinoin would help with this  Mornin  Wash your face with a gentle face wash - like Cetaphil wash, Cerave Hydrating , LaRoche Hydrating Cleanser   2  Continue Benzaclin lotion to entire affected area (Pt previously using this and likes it)  3  Follow with an oil-free sunscreen (SPF 30 or higher)  Some options include Neutrogena oil-free moisturizer with SPF         Night:    1  Wash your face with a gentle face wash - like Cetaphil wash, Cerave Hydrating , LaRoche Hydrating Cleanser   2  (New medication) Pat dry your face, wait 15 mins and then apply a pea-sized amount of over the counter Differin Gel or LaRoche Adapalene 0 1% gel - an even thin layer on your face  Can be drying  Start by using every other night and then build it up to every night  Avoid the eye area and the curves around your nose and corners of your lips  - If too irritating, keep using spaced out to every other night or every 3rd night and slowly increase frequency of use to nightly  Can also use on back and chest if you have these areas involved with acne  - If you were prescribed a prescription strength version and this is not covered by   3  Follow with an oil free moisturizer on top of this medicine to combat the dryness  Homeopathic Options:   Can trial over the counter Supplements for inflammatory acne include Zinc- 40mg daily OR Niacinamide 500mg twice daily  There is possible belly upset a/w these  Make sure all products you use on face are labeled as "non-comedongenic" or "oil-free" or " does not clog pores"  Acne can be frustrating and difficult to treat  Most acne regimens take 2-3 months to see an improvement, so stick with them  Don't give up!  As always, call your doctor if you have any concerns about your medications        Scribe Attestation    I,:  Jesus Whalen am acting as a scribe while in the presence of the attending physician :       I,:  Lalita Hyde MD personally performed the services described in this documentation    as scribed in my presence :

## 2021-08-04 ENCOUNTER — TELEPHONE (OUTPATIENT)
Dept: DERMATOLOGY | Facility: CLINIC | Age: 21
End: 2021-08-04

## 2023-02-17 ENCOUNTER — APPOINTMENT (EMERGENCY)
Dept: RADIOLOGY | Facility: HOSPITAL | Age: 23
End: 2023-02-17

## 2023-02-17 ENCOUNTER — HOSPITAL ENCOUNTER (EMERGENCY)
Facility: HOSPITAL | Age: 23
Discharge: HOME | End: 2023-02-17
Attending: EMERGENCY MEDICINE

## 2023-02-17 VITALS
HEIGHT: 66 IN | WEIGHT: 125 LBS | BODY MASS INDEX: 20.09 KG/M2 | DIASTOLIC BLOOD PRESSURE: 64 MMHG | RESPIRATION RATE: 16 BRPM | OXYGEN SATURATION: 100 % | TEMPERATURE: 97.3 F | SYSTOLIC BLOOD PRESSURE: 115 MMHG | HEART RATE: 64 BPM

## 2023-02-17 DIAGNOSIS — S40.012A CONTUSION OF LEFT SHOULDER, INITIAL ENCOUNTER: Primary | ICD-10-CM

## 2023-02-18 NOTE — ED PROVIDER NOTES
History  Chief Complaint   Patient presents with   • Shoulder Injury     Pt presents w L shoulder pain after a fall playing basketball last Friday     24year-old male presented for evaluation of left shoulder pain  Patient reported about a week ago while playing basketball, he made a wrong move, fell and hit his left shoulder to the ground  Patient reported feeling pain but the pain was tolerable enough to continue playing  The next day after the game, patient woke up with worsening pain in the left shoulder as well as some bruising  Patient stated he applied some ice to the area but did not take any pain meds  He was able to do his daily activity without substantial pain   Patient reports he went to play basketball again today and when he released the ball to score a three-point shot, he fell as if something tore in his shoulder reason why he presented to the ED tonight  Patient is seen at bedside in no acute distress but  reports pain with placement of his arm on top of his head  Prior to Admission Medications   Prescriptions Last Dose Informant Patient Reported? Taking?   fluocinonide (LIDEX) 0 05 % cream   No No   Sig: Apply 1 application topically 2 (two) times a day for 7 days   terbinafine (LamISIL) 250 mg tablet   Yes No   Sig: Take 250 mg by mouth daily      Facility-Administered Medications: None       Past Medical History:   Diagnosis Date   • Aortic stenosis        Past Surgical History:   Procedure Laterality Date   • CARDIAC SURGERY     • EYE SURGERY      x3   • MYRINGOTOMY     • TESTICLE SURGERY      1 removed   • THUMB FUSION      with 2 pins placed       History reviewed  No pertinent family history  I have reviewed and agree with the history as documented      E-Cigarette/Vaping   • E-Cigarette Use Never User      E-Cigarette/Vaping Substances   • Nicotine No    • THC No    • CBD No    • Flavoring No    • Other No    • Unknown No      Social History     Tobacco Use   • Smoking status: Former     Types: Cigarettes   • Smokeless tobacco: Never   Vaping Use   • Vaping Use: Never used   Substance Use Topics   • Alcohol use: Yes     Comment: drinks 2-3 per week    • Drug use: Yes     Types: Marijuana     Comment: ocassionally         Review of Systems   Constitutional: Negative for chills and fever  HENT: Negative for ear pain and sore throat  Eyes: Negative for pain and visual disturbance  Respiratory: Negative for cough and shortness of breath  Cardiovascular: Negative for chest pain and palpitations  Gastrointestinal: Negative for abdominal pain and vomiting  Genitourinary: Negative for dysuria and hematuria  Musculoskeletal: Positive for arthralgias (left shoulder)  Negative for back pain  Skin: Negative for color change and rash  Neurological: Negative for seizures and syncope  All other systems reviewed and are negative  Physical Exam  ED Triage Vitals   Temperature Pulse Respirations Blood Pressure SpO2   02/17/23 2108 02/17/23 2108 02/17/23 2108 02/17/23 2108 02/17/23 2108   (!) 97 3 °F (36 3 °C) 64 16 115/64 100 %      Temp Source Heart Rate Source Patient Position - Orthostatic VS BP Location FiO2 (%)   02/17/23 2108 02/17/23 2108 02/17/23 2108 02/17/23 2108 --   Oral Monitor Sitting Right arm       Pain Score       02/17/23 2119       6             Orthostatic Vital Signs  Vitals:    02/17/23 2108   BP: 115/64   Pulse: 64   Patient Position - Orthostatic VS: Sitting       Physical Exam  Vitals and nursing note reviewed  Constitutional:       General: He is not in acute distress  Appearance: He is well-developed  HENT:      Head: Normocephalic and atraumatic  Eyes:      Conjunctiva/sclera: Conjunctivae normal    Cardiovascular:      Rate and Rhythm: Normal rate and regular rhythm  Heart sounds: No murmur heard  Pulmonary:      Effort: Pulmonary effort is normal  No respiratory distress  Breath sounds: Normal breath sounds     Abdominal: Palpations: Abdomen is soft  Tenderness: There is no abdominal tenderness  Musculoskeletal:         General: Tenderness present  No swelling or deformity  Arms:       Cervical back: Neck supple  Comments: Tenderness present with palpation of the left acromioclavicular and shoulder joints  There is intact sensation in the arm and fingers  Skin:     General: Skin is warm and dry  Capillary Refill: Capillary refill takes less than 2 seconds  Neurological:      General: No focal deficit present  Mental Status: He is alert  Sensory: No sensory deficit  Psychiatric:         Mood and Affect: Mood normal          ED Medications  Medications - No data to display    Diagnostic Studies  Results Reviewed     None                 XR shoulder 2+ views LEFT    (Results Pending)         Procedures  Procedures      ED Course  ED Course as of 02/17/23 2320 Fri Feb 17, 2023   258 50-year-old male presenting for evaluation of left shoulder pain after he sustained an injury playing basketball last Friday 2238 We ordered a shoulder x-ray  Patient offered pain meds but declined  2315 XR shoulder 2+ views LEFT  X-ray of the shoulder showed no signs of dislocation or fracture  Patient was given an ice pack in the emergency room and was discharged with a referral to sports medicine  Return precautions discussed  Medical Decision Making  50-year-old male presenting for evaluation of left shoulder pain after he sustained an injury playing basketball last Friday  We ordered a shoulder x-ray  Patient offered pain meds but declined  Differential diagnoses include shoulder sprain, cervical tendinitis, shoulder strain, shoulder fracture    X-ray of the shoulder showed no signs of dislocation or fracture  Patient was given an ice pack in the emergency room and was discharged with a referral to sports medicine    Return precautions discussed  Contusion of left shoulder, initial encounter: complicated acute illness or injury  Amount and/or Complexity of Data Reviewed  Radiology: ordered  Decision-making details documented in ED Course  Disposition  Final diagnoses:   Contusion of left shoulder, initial encounter     Time reflects when diagnosis was documented in both MDM as applicable and the Disposition within this note     Time User Action Codes Description Comment    2/17/2023 10:44 PM Myranda Stone Add [S40 012A] Contusion of left shoulder, initial encounter       ED Disposition     ED Disposition   Discharge    Condition   Stable    Date/Time   Fri Feb 17, 2023 10:44 PM    Comment   [facility] discharge to home/self care  Follow-up Information     Follow up With Specialties Details Why [facility], MD Pediatrics   [address]      Onur Tong MD Sports Medicine, Orthopedic Surgery   [address] [place] [address]  [phone]            Discharge Medication List as of 2/17/2023 10:54 PM      CONTINUE these medications which have NOT CHANGED    Details   fluocinonide (LIDEX) 0 05 % cream Apply 1 application topically 2 (two) times a day for 7 days, Starting 4/23/2017, Until Sun 4/30/17, Print      terbinafine (LamISIL) 250 mg tablet Take 250 mg by mouth daily, Historical Med           No discharge procedures on file  PDMP Review     None           ED Provider  Attending physically available and evaluated Verna Marley I managed the patient along with the ED Attending      Electronically Signed by         Fadia Landeros MD  02/17/23 5391

## 2023-02-18 NOTE — ED NOTES
XR at bedside      [facility], [address] [place] [address]  02/17/23 6350

## 2023-02-18 NOTE — ED ATTENDING ATTESTATION
2/17/2023  IJacque DO, saw and evaluated the patient  I have discussed the patient with the resident/non-physician practitioner and agree with the resident's/non-physician practitioner's findings, Plan of Care, and MDM as documented in the resident's/non-physician practitioner's note, except where noted  All available labs and Radiology studies were reviewed  I was present for key portions of any procedure(s) performed by the resident/non-physician practitioner and I was immediately available to provide assistance  At this point I agree with the current assessment done in the Emergency Department  I have conducted an independent evaluation of this patient a history and physical is as follows:    Patient is a 26-year-old male with a history of aortic stenosis status post cardiac surgery who presents with left shoulder pain  Patient states that he was playing basketball about a week ago  He tripped and fell onto his left shoulder  He states that it hurt but he was able to continue to play basketball  He did not have significant pain throughout the week  However he attempted to play basketball this evening and had worsening of his left shoulder pain when attempting a jump shot  He states that pain radiates into the left bicep  He denies numbness, tingling, weakness, neck pain or other concerns  He has not taken anything for pain at home  On exam, patient is in no acute distress  Heart is regular rate and rhythm  Breath sounds normal   Left shoulder is nontender to palpation  Full range of motion in left shoulder  Negative drop arm test  Pain with Vern's test   Distal extremity is warm and well-perfused  Motor, sensation normal     X-ray negative for fracture or dislocation  We will treat for left shoulder contusion versus strain  Recommend Tylenol, ice, rest and outpatient follow-up  Will refer to sports medicine    Patient stable for discharge and outpatient follow-up  Portions of the above record have been created with voice recognition software  Occasional wrong word or "sound alike" substitutions may have occurred due to the inherent limitations of voice recognition software  Read the chart carefully and recognize, using context, where substitutions may have occurred        ED Course         Critical Care Time  Procedures

## 2023-02-18 NOTE — DISCHARGE INSTRUCTIONS
Follow-up with your PCP    Call Dr Wagner [facility] office to to make an appointment  He is part of [facility]'s sports medicine team    Take Tylenol as needed for pain    Rest the shoulder, apply ice pack and try to move your shoulder as tolerated  Return sooner to the ED if you feel worse  5

## 2023-02-20 ENCOUNTER — TELEPHONE (OUTPATIENT)
Dept: OBGYN CLINIC | Facility: HOSPITAL | Age: 23
End: 2023-02-20

## 2023-02-22 ENCOUNTER — OFFICE VISIT (OUTPATIENT)
Dept: OBGYN CLINIC | Facility: MEDICAL CENTER | Age: 23
End: 2023-02-22

## 2023-02-22 VITALS
DIASTOLIC BLOOD PRESSURE: 80 MMHG | BODY MASS INDEX: 22.02 KG/M2 | SYSTOLIC BLOOD PRESSURE: 122 MMHG | HEIGHT: 66 IN | HEART RATE: 61 BPM | WEIGHT: 137 LBS

## 2023-02-22 DIAGNOSIS — S43.52XA SPRAIN OF ACROMIOCLAVICULAR JOINT, LEFT, INITIAL ENCOUNTER: Primary | ICD-10-CM

## 2023-02-22 NOTE — PROGRESS NOTES
Ortho Sports Medicine Shoulder Visit     Assesment:   left shoulder mild ac joint sprain, possible small labral tear     Plan:    Conservative treatment:    Ice to shoulder 1-2 times daily, for 20 minutes at a time  PT for ROM and strengthening to shoulder, rotator cuff, scapular stabilizers  Home exercise program for shoulder, including ROM and strenthening  Instructions given to patient of what exercises to perform  If no improvement in 6-8 weeks then would get MRI arthrogram to further evaluate left shoulder  Imaging: All imaging from today was reviewed by myself and explained to the patient  Injection:    No Injection planned at this time  Surgery:     No surgery is recommended at this point, continue with conservative treatment plan as noted  History of Present Illness: The patient is a 20 y o , right hand dominant male whose occupation is unknown, referred to me by the emergency room, seen in clinic for consultation of left shoulder pain  The patient denies a history of diabetes  The patient denies a history of thyroid disorder  Pain is located superior shoulder  The pain has been present for 2 weeks  The patient sustained an injury around 2 weeks ago  He fell onto left shoulder while playing basketball, arm tucked to his side  He has some pain but next day increase in pain  Seen at ED a week later imaging negative for fracture  He has full motion of shoulder but pain with overhead activities, reaching back and out to grab items  He states he does have history of clavicle fracture 9 years ago on left which healed with conservative treatment  The pain is characterized as sharp, dull, achy  The pain is present daily  Pain is improved by rest and NSAIDS  Pain is aggravated by overhead activity, reaching back, rotation and lifting   The patient denies weakness  The patient denies numbness and tingling  The patient has tried rest, ice and NSAIDS  Shoulder Surgical History:  None    Past Medical, Social and Family History:  Past Medical History:   Diagnosis Date   • Aortic stenosis      Past Surgical History:   Procedure Laterality Date   • CARDIAC SURGERY     • EYE SURGERY      x3   • MYRINGOTOMY     • TESTICLE SURGERY      1 removed   • THUMB FUSION      with 2 pins placed     No Known Allergies  Current Outpatient Medications on File Prior to Visit   Medication Sig Dispense Refill   • fluocinonide (LIDEX) 0 05 % cream Apply 1 application topically 2 (two) times a day for 7 days 30 g 0   • terbinafine (LamISIL) 250 mg tablet Take 250 mg by mouth daily (Patient not taking: Reported on 2/22/2023)       No current facility-administered medications on file prior to visit  Social History     Socioeconomic History   • Marital status: Single     Spouse name: Not on file   • Number of children: Not on file   • Years of education: Not on file   • Highest education level: Not on file   Occupational History   • Not on file   Tobacco Use   • Smoking status: Former     Types: Cigarettes   • Smokeless tobacco: Never   Vaping Use   • Vaping Use: Never used   Substance and Sexual Activity   • Alcohol use: Yes     Comment: drinks 2-3 per week    • Drug use: Yes     Types: Marijuana     Comment: ocassionally    • Sexual activity: Not on file   Other Topics Concern   • Not on file   Social History Narrative   • Not on file     Social Determinants of Health     Financial Resource Strain: Not on file   Food Insecurity: Not on file   Transportation Needs: Not on file   Physical Activity: Not on file   Stress: Not on file   Social Connections: Not on file   Intimate Partner Violence: Not on file   Housing Stability: Not on file         I have reviewed the past medical, surgical, social and family history, medications and allergies as documented in the EMR  Review of systems: ROS is negative other than that noted in the HPI    Constitutional: Negative for fatigue and fever    HENT: Negative for sore throat  Respiratory: Negative for shortness of breath  Cardiovascular: Negative for chest pain  Gastrointestinal: Negative for abdominal pain  Endocrine: Negative for cold intolerance and heat intolerance  Genitourinary: Negative for flank pain  Musculoskeletal: Negative for back pain  Skin: Negative for rash  Allergic/Immunologic: Negative for immunocompromised state  Neurological: Negative for dizziness  Psychiatric/Behavioral: Negative for agitation  Physical Exam:    Blood pressure 122/80, pulse 61, height 5' 6" (1 676 m), weight 62 1 kg (137 lb)  General/Constitutional: NAD, well developed, well nourished  HENT: Normocephalic, atraumatic  CV: Intact distal pulses, regular rate  Resp: No respiratory distress or labored breathing  Lymphatic: No lymphadenopathy palpated  Neuro: Alert and Oriented x 3, no focal deficits  Psych: Normal mood, normal affect, normal judgement, normal behavior  Skin: Warm, dry, no rashes, no erythema     Shoulder Exam (focused):     Shoulder focused exam:       RIGHT LEFT    Scapula Atrophy Negative Negative     Winging Negative Negative     Protraction Negative Negative    Rotator cuff SS 5/5 5/5     IS 5/5 5/5     SubS 5/5 5/5    ROM  170     ER0 60 60     ER90 90 90     IR90 40 40     IRb T6 T6    TTP: AC Negative Positive     Biceps Negative Negative     Coracoid Negative Negative    Special Tests: O'Briens Negative Negative     Hardwick-shear Negative Negative     Cross body Adduction Negative Negative     Speeds  Negative Positive     Vern's Negative Negative     Whipple Negative Negative       Neer Negative Negative     Fagan Negative Negative    Instability: Apprehension & relocation not tested not tested     Load & shift not tested not tested    Other: Crank Negative Negative               UE NV Exam: +2 Radial pulses bilaterally  Sensation intact to light touch C5-T1 bilaterally, Radial/median/ulnar nerve motor intact      Bilateral elbow, wrist, and and forearm ROM full, painless with passive ROM, no ttp or crepitance throughout extremities below shoulder joint    Cervical ROM is full without pain, numbness or tingling      Shoulder Imaging    X-rays of the left shoulder were reviewed, which demonstrate no acute fracture or dislocation  I have reviewed the radiology report and agree with their impression          Scribe Attestation    I,:  Randy Medeiros am acting as a scribe while in the presence of the attending physician :       I,:  Kadi Kurtz DO personally performed the services described in this documentation    as scribed in my presence :

## 2023-02-22 NOTE — LETTER
February 22, 2023     Ada Covington MD    Patient: Lisa Smith   YOB: 2000   Date of Visit: 2/22/2023       Dear Dr Ole Wang:    Thank you for referring Feroz Cain to me for evaluation  Below are my notes for this consultation  If you have questions, please do not hesitate to call me  I look forward to following your patient along with you           Sincerely,        Corine Jones, DO        CC: No Recipients

## 2023-03-30 ENCOUNTER — EVALUATION (OUTPATIENT)
Dept: PHYSICAL THERAPY | Facility: CLINIC | Age: 23
End: 2023-03-30

## 2023-03-30 DIAGNOSIS — S43.52XA SPRAIN OF ACROMIOCLAVICULAR JOINT, LEFT, INITIAL ENCOUNTER: ICD-10-CM

## 2023-03-30 NOTE — PROGRESS NOTES
PT Evaluation     Today's date: 3/30/2023  Patient name: Beatriz Chamorro  : 2000  MRN: [id number]  Referring provider: Ward Rodriguez DO  Dx:   Encounter Diagnosis     ICD-10-CM    1  Sprain of acromioclavicular joint, left, initial encounter  S43  52XA Ambulatory Referral to Physical Therapy                     Assessment  Assessment details: Beatriz Chamorro is a 20 y o  male with sprain of the L acromioclavicular joint  He presents with pain, decreased strength, decreased ROM, impaired sensation, and postural  dysfunction  Due to these impairments, Patient has difficulty performing a/iadls, recreational activities and engaging in social activities  Patient's clinical presentation is consistent with their referring diagnosis  Patient would benefit from skilled physical therapy to address their aforementioned impairments, improve their level of function and to improve their overall quality of life   has been given a home exercise program and is in agreement with the plan of care  Thank you for your referral   Impairments: abnormal or restricted ROM, impaired physical strength, lacks appropriate home exercise program and pain with function    Symptom irritability: lowUnderstanding of Dx/Px/POC: excellent  Goals  ST Goals - 2-4 weeks  1  Patient will report decreased pain with activity by at least 2 points within 4 weeks  2  Patient will improve ROM 5-10 degrees within 4 weeks  3  Patient will demonstrate ability to actively correct posture without cueing within 4 weeks  4  Patient will perform IADLs without pain in 2 weeks  5  Patient will increase strength by 25% in 4 weeks    LT Goals - Discharge  1  Patient will improve FOTO score to maximum stated or greater by discharge  2  Patient will return to preferred recreational activity without significant pain increase by discharge   3    Patient will return to all work related activities without pain by discharge     Plan  Patient would benefit from: skilled physical therapy  Referral necessary: No  Planned therapy interventions: joint mobilization, manual therapy, strengthening, stretching, therapeutic activities, therapeutic exercise and home exercise program  Frequency: 2x week  Duration in visits: 20  Duration in weeks: 20  Plan of Care beginning date: 3/30/2023  Plan of Care expiration date: 2023        Subjective Evaluation    History of Present Illness  Mechanism of injury: Approx 5-6 weeks ago patient was playing basketball, and fell on the posterior aspect of the L shoulder  He was able to continue playing but then went back the next week to play again and realized he couldn't move the shoulder as well  He felt like he tore something after that  He did wake up with neck pain in the last few days but that is possibly related to the sleeping position  CC:  Pain in the shoulder which is worse in the AM possibly related to sleeping position  Cross body and forward flexion are painful  Function:   He was working as a golf assistant  He is in between jobs but is looking for another job in that area  He has a [place] trip coming up in 3 weeks ()  He does also work out regularly  Recurrent probem    Quality of life: excellent    Pain  Current pain ratin  At best pain ratin  At worst pain ratin  Aggravating factors: overhead activity and lifting  Progression: improved    Hand dominance: right      Diagnostic Tests  X-ray: normal  Patient Goals  Patient goals for therapy: increased strength, independence with ADLs/IADLs, return to sport/leisure activities, return to work, increased motion and decreased pain          Objective     Tenderness     Left Shoulder   Tenderness in the [facility] [place] joint, acromion, bicipital groove and subacromial bursa       Cervical/Thoracic Screen   Cervical range of motion within normal limits  Thoracic range of motion within normal limits    Active Range of Motion   Left Shoulder   Flexion: 126 degrees with pain  Abduction: 110 degrees with pain  External rotation BTH: C7 with pain  Internal rotation BTB: lumbar with pain    Right Shoulder   Flexion: 150 degrees   Abduction: 160 degrees   External rotation BTH: WFL  Internal rotation BTB: WFL    Passive Range of Motion   Left Shoulder   Flexion: 162 degrees   Abduction: 170 degrees   External rotation 45°: 77 degrees   Internal rotation 45°: 70 degrees     Right Shoulder   External rotation 45°: 77 degrees   Internal rotation 45°: 70 degrees     Strength/Myotome Testing     Left Shoulder     Planes of Motion   Flexion: 5   Abduction: 5   External rotation BTH: 4   Internal rotation at 45°: 4+     Right Shoulder   Normal muscle strength    Tests     Left Shoulder   Positive crossover and Hawkin's                Precautions: na   [id number]         Manuals 3/30                                                                Neuro Re-Ed                                       Pulley nv            Table ER 5 x :20            Wall climb flexion / scap 5 x :10            pec doorway st 5 x :20                         Ther Ex                                                                                                                     Ther Activity                                       Gait Training                                       Modalities

## 2023-04-05 ENCOUNTER — OFFICE VISIT (OUTPATIENT)
Dept: PHYSICAL THERAPY | Facility: CLINIC | Age: 23
End: 2023-04-05

## 2023-04-05 DIAGNOSIS — S43.52XA SPRAIN OF ACROMIOCLAVICULAR JOINT, LEFT, INITIAL ENCOUNTER: Primary | ICD-10-CM

## 2023-04-05 NOTE — PROGRESS NOTES
Daily Note     Today's date: 2023  Patient name: Slick Conrad  : 2000  MRN: [id number]  Referring provider: Steve French DO  Dx:   Encounter Diagnosis     ICD-10-CM    1  Sprain of acromioclavicular joint, left, initial encounter  S43  52XA                      Subjective: Patient reports that he is feeling some anterior shoulder discomfort likely due to pec stretching  He also notices popping in the [facility] joint  Objective: See treatment diary below      Assessment: Tolerated treatment well  Patient would benefit from continued PT  Fatigue and weakness noted t/o the L UE  Given TB for home  Plan: Continue per plan of care        Precautions: na   [id number]         Manuals 3/30 4/5                                                               Neuro Re-Ed             TB row/ext  GTB x 20                        Pulley nv 3'           Table ER 5 x :20            Wall climb flexion / scap 5 x :10 5x:10           pec doorway st 5 x :20 5 x :20           S/l ER  3#/4# x 10 estuardo           S/l horiz abd  2# x 20           Prone Row  4# x20           Prone Ext   3#                                                                                         Ther Activity                                       Gait Training                                       Modalities

## 2023-04-24 ENCOUNTER — OFFICE VISIT (OUTPATIENT)
Dept: PHYSICAL THERAPY | Facility: CLINIC | Age: 23
End: 2023-04-24

## 2023-04-24 DIAGNOSIS — S43.52XA SPRAIN OF ACROMIOCLAVICULAR JOINT, LEFT, INITIAL ENCOUNTER: Primary | ICD-10-CM

## 2023-04-24 NOTE — PROGRESS NOTES
"Daily Note     Today's date: 2023  Patient name: Helen Doran  : 2000  MRN: [id number]  Referring provider: Marsha Cosby DO  Dx: No diagnosis found  Subjective: Patient states that he is challenged with wall slides at home  Objective: See treatment diary below      Assessment: Tolerated treatment well  Patient exhibited good technique with therapeutic exercises      Plan: Continue per plan of care        Precautions: na   [id number]         Manuals 3/30 4/5 4/13 4/17 4/19 4/24                                                           Neuro Re-Ed             B chaitanya rows   12# 2 x 10  12# 2 x 10  12# 2 x 10   12# 2 x 10        B chaitanya extension   10# 2 x 10  11 8# 2 x 10  12# 2 x 10  12# 2 x 10       TB row/ext  GTB x 20           Postural ER    RTB 10  RTB x 10  RTB x 10        Pulley nv 3' 3 3 4 4       Table ER 5 x :20            Wall climb flexion / scap 5 x :10 5x:10 5 x 10\"  10\" x 5  10\" x 5  10\" x 5        pec doorway st 5 x :20 5 x :20 5 x 20\" 5 x 20\"  5 x 20\" 5 x 20\"       S/l ER  3#/4# x 10 estuardo  3# 20  3# 20  3# 20        S/l horiz abd  2# x 20  2#20 3# 20  3# 20       Prone Row  4# x20 4# 20  4# 20 4# 20  43 25       Prone Ext   3# 3# 20  3# 20  3# 20  4# 20        Wall climbs   5x RTB 5 x RTB 5 laps RTB 2 x 5       Wall slides    10  10 5        UBE     3/3 3/3                                              Ther Activity                                       Gait Training                                       Modalities                                            "

## 2023-04-26 ENCOUNTER — OFFICE VISIT (OUTPATIENT)
Dept: PHYSICAL THERAPY | Facility: CLINIC | Age: 23
End: 2023-04-26

## 2023-04-26 DIAGNOSIS — S43.52XA SPRAIN OF ACROMIOCLAVICULAR JOINT, LEFT, INITIAL ENCOUNTER: Primary | ICD-10-CM

## 2023-04-26 NOTE — PROGRESS NOTES
"Daily Note     Today's date: 2023  Patient name: Keara Concepcion  : 2000  MRN: [id number]  Referring provider: Ubaldo Monsivais DO  Dx: No diagnosis found  Subjective: Patient states that he was sore/increase in pain following last treatment session      Objective: See treatment diary below      Assessment: Tolerated treatment well  Patient exhibited good technique with therapeutic exercises      Plan: Continue per plan of care        Precautions: na   [id number]         Manuals 3/30 4/5 4/13 4/17 4/19 4/24 4/26                                                          Neuro Re-Ed             B chaitanya rows   12# 2 x 10  12# 2 x 10  12# 2 x 10   12# 2 x 10  12 5# 3 x 10       B chaitanya extension   10# 2 x 10  11 8# 2 x 10  12# 2 x 10  12# 2 x 10 12 5# 3 x 10      TB row/ext  GTB x 20           Postural ER    RTB 10  RTB x 10  RTB x 10  RTb x 10       Pulley nv 3' 3 3 4 4 4      Table ER 5 x :20            Wall climb flexion / scap 5 x :10 5x:10 5 x 10\"  10\" x 5  10\" x 5  10\" x 5  10\"  x 5      pec doorway st 5 x :20 5 x :20 5 x 20\" 5 x 20\"  5 x 20\" 5 x 20\" 5 x 20\"       S/l ER  3#/4# x 10 estuardo  3# 20  3# 20  3# 20        S/l horiz abd  2# x 20  2#20 3# 20  3# 20 3# 20       Prone Row  4# x20 4# 20  4# 20 4# 20  4# 25 4# 30       Prone Ext   3# 3# 20  3# 20  3# 20  4# 20  4# 20       Prone rows with ER       2# 20       Wall climbs   5x RTB 5 x RTB 5 laps RTB 2 x 5       Wall slides    10  10 5        UBE     3/3 3/3 3/3                                             Ther Activity                                       Gait Training                                       Modalities                                            "

## 2023-05-01 ENCOUNTER — OFFICE VISIT (OUTPATIENT)
Dept: PHYSICAL THERAPY | Facility: CLINIC | Age: 23
End: 2023-05-01

## 2023-05-01 DIAGNOSIS — S43.52XA SPRAIN OF ACROMIOCLAVICULAR JOINT, LEFT, INITIAL ENCOUNTER: Primary | ICD-10-CM

## 2023-05-01 NOTE — PROGRESS NOTES
"Daily Note     Today's date: 2023  Patient name: Simona Dooley  : 2000  MRN: [id number]  Referring provider: Marvin Sharma DO  Dx: No diagnosis found  Subjective: Patient states that he has noticed an increase in soreness over the weekend  Encouraged compliance with HEP and altered pec stretch to supine position with better tolerance  Objective: See treatment diary below      Assessment: Tolerated treatment well  Patient exhibited good technique with therapeutic exercises      Plan: Continue per plan of care        Precautions: na   [id number]         Manuals 3/30 4/5 4/13 4/17 4/19 4/24 4/26 5/1                                                         Neuro Re-Ed             B chaitanya rows   12# 2 x 10  12# 2 x 10  12# 2 x 10   12# 2 x 10  12 5# 3 x 10  12 5# 2 x 10      B chaitanya extension   10# 2 x 10  11 8# 2 x 10  12# 2 x 10  12# 2 x 10 12 5# 3 x 10 12 5# 2 x 10      TB row/ext  GTB x 20           Postural ER    RTB 10  RTB x 10  RTB x 10  RTb x 10       Pulley nv 3' 3 3 4 4 4 5 min      Table ER 5 x :20            Wall climb flexion / scap 5 x :10 5x:10 5 x 10\"  10\" x 5  10\" x 5  10\" x 5  10\"  x 5 10\" x 5      pec doorway st 5 x :20 5 x :20 5 x 20\" 5 x 20\"  5 x 20\" 5 x 20\" 5 x 20\"  5 x 20\"      S/l ER  3#/4# x 10 estuardo  3# 20  3# 20  3# 20   3# 20      S/l horiz abd  2# x 20  2#20 3# 20  3# 20 3# 20       Prone Row  4# x20 4# 20  4# 20 4# 20  4# 25 4# 30       Prone Ext   3# 3# 20  3# 20  3# 20  4# 20  4# 20       Prone rows with ER       2# 20       Wall climbs   5x RTB 5 x RTB 5 laps RTB 2 x 5  held     Wall slides    10  10 5   held     UBE     3/3 3/3 3/3 3/3                                            Ther Activity                                       Gait Training                                       Modalities                                            "

## 2023-05-16 ENCOUNTER — HOSPITAL ENCOUNTER (OUTPATIENT)
Dept: MRI IMAGING | Facility: HOSPITAL | Age: 23
Discharge: HOME | End: 2023-05-16

## 2023-05-16 ENCOUNTER — HOSPITAL ENCOUNTER (OUTPATIENT)
Dept: RADIOLOGY | Facility: HOSPITAL | Age: 23
Discharge: HOME | End: 2023-05-16

## 2023-05-16 DIAGNOSIS — S43.432A SUPERIOR GLENOID LABRUM LESION OF LEFT SHOULDER, INITIAL ENCOUNTER: ICD-10-CM

## 2023-05-16 RX ORDER — LIDOCAINE HYDROCHLORIDE 10 MG/ML
4 INJECTION, SOLUTION EPIDURAL; INFILTRATION; INTRACAUDAL; PERINEURAL ONCE
Status: COMPLETED | OUTPATIENT
Start: 2023-05-16 | End: 2023-05-16

## 2023-05-16 RX ADMIN — IOHEXOL 1 ML: 300 INJECTION, SOLUTION INTRAVENOUS at 10:45

## 2023-05-16 RX ADMIN — GADOBUTROL 0.2 ML: 604.72 INJECTION INTRAVENOUS at 10:46

## 2023-05-16 RX ADMIN — LIDOCAINE HYDROCHLORIDE 4 ML: 10 INJECTION, SOLUTION EPIDURAL; INFILTRATION; INTRACAUDAL; PERINEURAL at 10:46

## 2023-06-02 ENCOUNTER — OFFICE VISIT (OUTPATIENT)
Dept: OBGYN CLINIC | Facility: MEDICAL CENTER | Age: 23
End: 2023-06-02

## 2023-06-02 VITALS
DIASTOLIC BLOOD PRESSURE: 77 MMHG | SYSTOLIC BLOOD PRESSURE: 115 MMHG | BODY MASS INDEX: 20.41 KG/M2 | HEIGHT: 66 IN | HEART RATE: 53 BPM | WEIGHT: 127 LBS

## 2023-06-02 DIAGNOSIS — S43.432A TEAR OF LEFT GLENOID LABRUM, INITIAL ENCOUNTER: ICD-10-CM

## 2023-06-02 DIAGNOSIS — S43.52XD SPRAIN OF ACROMIOCLAVICULAR JOINT, LEFT, SUBSEQUENT ENCOUNTER: Primary | ICD-10-CM

## 2023-06-02 NOTE — PROGRESS NOTES
Ortho Sports Medicine Shoulder Visit     Assesment:   left shoulder small labral tear 10-11 o'clock     Plan:    Conservative treatment:    Ice to shoulder 1-2 times daily, for 20 minutes at a time  MRI arthrogram reviewed today with patient  We discussed continued conservative vs surgical intervention  Continue with physical therapy and HEP, stretching, scapular stabilization  If symptoms persist then we can discuss arthroscopic labral repair vs cortisone injection intra articular  See back in 8 weeks       Imaging: All imaging from today was reviewed by myself and explained to the patient  Injection:    No Injection planned at this time  Surgery:     No surgery is recommended at this point, continue with conservative treatment plan as noted  History of Present Illness: The patient is returns for follow up of Left shoulder injury  He is here to review MRI arthrogram      He denies isma pain but states there are some motions that create an 'uncomfortable feeling'  No history shoulder dislocation  He states that with shoulder rotation he will experience a nonpainful click  He did 8 session of PT and has been doing light stretching  Avoiding overhead activities      Pain is improved by rest   Pain is aggravated by overhead activity and rotation  Symptoms include clicking  The patient denies weakness  The patient has tried rest, ice, NSAIDS and physical therapy  I have reviewed the past medical, surgical, social and family history, medications and allergies as documented in the EMR  Review of systems: ROS is negative other than that noted in the HPI  Constitutional: Negative for fatigue and fever     Cardiovascular: Negative for chest pain  Pulmonary: negative for shortness of breath    PMH/PSH:  Past Medical History:   Diagnosis Date   • Aortic stenosis      Past Surgical History:   Procedure Laterality Date   • CARDIAC SURGERY     • EYE SURGERY      x3   • [place] "INJECTION LEFT SHOULDER (ARTHROGRAM)  5/16/2023   • MYRINGOTOMY     • TESTICLE SURGERY      1 removed   • THUMB FUSION      with 2 pins placed        Physical Exam:    Blood pressure 115/77, pulse (!) 53, height 5' 6\" (1 676 m), weight 57 6 kg (127 lb)  General/Constitutional: NAD, well developed, well nourished  HENT: Normocephalic, atraumatic  CV: Intact distal pulses, regular rate  Resp: No respiratory distress or labored breathing  Lymphatic: No lymphadenopathy palpated  Neuro: Alert and Oriented x 3, no focal deficits  Psych: Normal mood, normal affect, normal judgement, normal behavior  Skin: Warm, dry, no rashes, no erythema     Shoulder Exam (focused): Shoulder focused exam:       RIGHT LEFT    Scapula Atrophy Negative Negative     Winging Negative Negative     Protraction Negative Negative    Rotator cuff SS 5/5 5/5     IS 5/5 5/5     SubS 5/5 5/5    ROM  170     ER0 60 60     ER90 90 90     IR90 40 40     IRb T6 T6    TTP: AC Negative Negative     Biceps Negative Negative     Coracoid Negative Negative    Special Tests: O'Briens Negative Negative     Hardwick-shear Negative Negative     Cross body Adduction Negative Negative     Speeds  Negative Negative     Vern's Negative Negative     Whipple Negative Negative       Neer Negative Negative     Fagan Negative Negative    Instability: Apprehension & relocation not tested not tested     Load & shift not tested not tested    Other: Crank Negative Negative               UE NV Exam: +2 Radial pulses bilaterally  Sensation intact to light touch C5-T1 bilaterally, Radial/median/ulnar nerve motor intact    Cervical ROM is full without pain, numbness or tingling      Shoulder Imaging    MRI of the left shoulder were reviewed, which demonstrate tiny labral tear from 10-11 o'clock position  I have reviewed the radiology report and agree with their impression        Scribe Attestation    I,:  Lakeisha Moreland am acting as a scribe while in the presence of the " attending physician :       I,:  Lindsay Garcia DO personally performed the services described in this documentation    as scribed in my presence :

## 2023-06-06 ENCOUNTER — EVALUATION (OUTPATIENT)
Dept: PHYSICAL THERAPY | Facility: CLINIC | Age: 23
End: 2023-06-06
Payer: COMMERCIAL

## 2023-06-06 DIAGNOSIS — S43.432A TEAR OF LEFT GLENOID LABRUM, INITIAL ENCOUNTER: Primary | ICD-10-CM

## 2023-06-06 PROCEDURE — 97110 THERAPEUTIC EXERCISES: CPT

## 2023-06-06 PROCEDURE — 97161 PT EVAL LOW COMPLEX 20 MIN: CPT

## 2023-06-06 PROCEDURE — 97112 NEUROMUSCULAR REEDUCATION: CPT

## 2023-06-06 NOTE — PROGRESS NOTES
PT Evaluation     Today's date: 2023  Patient name: Som Jacobson  : 2000  MRN: [id number]  Referring provider: Ted Pereira DO  Dx:   Encounter Diagnosis     ICD-10-CM    1  Tear of left glenoid labrum, initial encounter  S43 432A Ambulatory Referral to Physical Therapy                     Assessment  Assessment details: Bibi De La Cruz is a 23 yo male presenting with complaints of L shoulder pain with reaching/lifting  MRI revealing labral tear  Pt demonstrates pain and end range of abduction/external rotation, decreased UE weight bearing tolerance and poor neuromuscular control/stability leading to limitations with work related activities and ADLs  Pt would benefit from skilled physical therapy interventions in order to address the stated impairments, decrease pain with function and increase activity tolerance in order to improve quality of life  No further referral appears necessary at this time based on examination results  Prognosis is good given HEP compliance and PT 1-2/wk   Positive prognostic indicators include positive attitude toward recovery  Please contact me if you have any questions or recommendations  Thank you for the opportunity to share in Rafael's care      Impairments: abnormal muscle firing, abnormal or restricted ROM, activity intolerance, lacks appropriate home exercise program, pain with function, weight-bearing intolerance, poor posture  and poor body mechanics    Symptom irritability: moderateBarriers to therapy: None  Understanding of Dx/Px/POC: good   Prognosis: good    Goals  Short Term Goals:  Pt will report decreased levels of pain by at least 2 subjective ratings in 4 weeks  Pt will demonstrate improved ROM by at least 10 degrees in 4 weeks  Pt will demonstrate improved strength by ½ grade in 4 weeks  Pt will be able to play 9 holes golf without pain in 4 weeks    Long Term Goals:  Pt will be independent with HEP in 8 weeks  Pt will be pain free with ADL's in 8 weeks  Pt will be able to play 18 holes golf for work in 8 weeks      Plan  Patient would benefit from: skilled physical therapy  Referral necessary: No  Planned modality interventions: low level laser therapy  Planned therapy interventions: abdominal trunk stabilization, balance/weight bearing training, body mechanics training, breathing training, functional ROM exercises, graded activity, graded exercise, home exercise program, joint mobilization, manual therapy, neuromuscular re-education, patient education, postural training, strengthening, stretching, therapeutic activities and therapeutic exercise  Frequency: 2x week  Duration in weeks: 12  Plan of Care beginning date: 2023  Plan of Care expiration date: 2023  Treatment plan discussed with: patient        Subjective Evaluation    History of Present Illness  Mechanism of injury: Pt reports back in February he was playing basketball when he got knocked OB and landed on his shoulder in a weird way  He has had pain and stiffness ever since  Did do a full course of PT during month of April  Did have some improvement but continues to have pain with reaching and lifting  MRI reveals labral tear in 10-11 o'clock positions  Pt needs to be able to golf for work  Pain  Current pain ratin  At best pain rating: 3  At worst pain ratin  Quality: dull ache and tight  Relieving factors: ice  Aggravating factors: lifting (reaching)  Progression: improved    Social Support  Steps to enter house: yes  Stairs in house: yes   Lives with: Family  Employment status: working  Hand dominance: right      Diagnostic Tests  MRI studies: abnormal  Treatments  Previous treatment: physical therapy  Patient Goals  Patient goals for therapy: decreased pain, increased motion, increased strength, independence with ADLs/IADLs and return to sport/leisure activities          Objective     Tenderness     Left Shoulder   Tenderness in the biceps tendon (proximal)       Cervical/Thoracic Screen   Cervical range of motion within normal limits    Neurological Testing     Sensation     Shoulder   Left Shoulder   Intact: light touch    Right Shoulder   Intact: light touch    Active Range of Motion   Left Shoulder   Flexion: 175 degrees   Abduction: 160 degrees   External rotation 0°: 55 degrees   External rotation 90°: 70 degrees with pain  External rotation BTH: C5 with pain  Internal rotation BTB: T10     Right Shoulder   Normal active range of motion  External rotation BTH: C5   Internal rotation BTB: T11     Strength/Myotome Testing     Left Shoulder     Planes of Motion   Flexion: 4+   Extension: 4+   Abduction: 4+   Adduction: 4+   External rotation at 0°: 4+   External rotation at 90°: 4   Internal rotation at 0°: 4+     Isolated Muscles   Biceps: 4+   Lower trapezius: 4   Middle trapezius: 4   Triceps: 4+     Right Shoulder   Normal muscle strength    Tests     Left Shoulder   Negative anterior load and shift, belly press, clunk, crank, drop arm, empty can, external rotation lag sign, full can, Hawkin's, lift-off, Neer's, painful arc, passive horizontal adduction, Speed's and bicep load                 Precautions: L shoulder labrum tear      Manuals 6/6                                                                Neuro Re-Ed             High plank on table shoulder taps 3x10            High plank on table banded lateral hand walking GTB 3x10 ea            Pallof press Silver 2x10 ea            1/2 Welsh get up w/ KB             Plank on bosu             Manually resisted rhythmic stabilization             Body blade                                                    Ther Ex             TB rows GTB 3x10            TB IR GTB 3x10            TB 90/90 ER GTB 3x10            No monnies                                                                 Ther Activity                                       Gait Training                                       Modalities

## 2023-06-08 ENCOUNTER — OFFICE VISIT (OUTPATIENT)
Dept: PHYSICAL THERAPY | Facility: CLINIC | Age: 23
End: 2023-06-08
Payer: COMMERCIAL

## 2023-06-08 DIAGNOSIS — S43.432A TEAR OF LEFT GLENOID LABRUM, INITIAL ENCOUNTER: Primary | ICD-10-CM

## 2023-06-08 PROCEDURE — 97110 THERAPEUTIC EXERCISES: CPT

## 2023-06-08 PROCEDURE — 97112 NEUROMUSCULAR REEDUCATION: CPT

## 2023-06-08 NOTE — PROGRESS NOTES
"Daily Note     Today's date: 2023  Patient name: Catalina Shen  : 2000  MRN: [id number]  Referring provider: Eve Hill DO  Dx:   Encounter Diagnosis     ICD-10-CM    1  Tear of left glenoid labrum, initial encounter  S43 432A                      Subjective: Pt reports some muscle soreness from exercises  Objective: See treatment diary below      Assessment: Pt tolerates interventions very well  Demonstrates fair weight bearing tolerance  Did have some discomfort with pallof press  Continue to progress stabilization training as able  Plan: Continue per plan of care  Progress treatment as tolerated         Precautions: L shoulder labrum tear      Manuals                                                                Neuro Re-Ed             High plank on table shoulder taps 3x10 3x10           High plank on table banded lateral hand walking GTB 3x10 ea GTB 3x10 ea           Pallof press Silver 2x10 ea Silver 2x10 ea           1/2 Kinyarwanda get up w/ KB             Plank on bosu             Man resisted rhythmic stabilization  3x30\" 3#           Body blade @ 90 deg FF  3x30\"                                                  Ther Ex             TB rows GTB 3x10 GTB 3x10           TB ER/IR GTB 3x10 GTB 3x10           TB 90/90 ER GTB 3x10 GTB 3x10           No monnies  GTB 3x10                                                               Ther Activity                                       Gait Training                                       Modalities                                            "

## 2023-06-13 ENCOUNTER — OFFICE VISIT (OUTPATIENT)
Dept: PHYSICAL THERAPY | Facility: CLINIC | Age: 23
End: 2023-06-13
Payer: COMMERCIAL

## 2023-06-13 DIAGNOSIS — S43.432A TEAR OF LEFT GLENOID LABRUM, INITIAL ENCOUNTER: Primary | ICD-10-CM

## 2023-06-13 PROCEDURE — 97110 THERAPEUTIC EXERCISES: CPT

## 2023-06-13 PROCEDURE — 97112 NEUROMUSCULAR REEDUCATION: CPT

## 2023-06-13 NOTE — PROGRESS NOTES
"Daily Note     Today's date: 2023  Patient name: Bernice Gee  : 2000  MRN: [id number]  Referring provider: Susan Acevedo DO  Dx:   Encounter Diagnosis     ICD-10-CM    1  Tear of left glenoid labrum, initial encounter  S43 432A                      Subjective: Pt reports less soreness following exercises  Objective: See treatment diary below      Assessment: Pt demonstrated improved weight bearing tolerance and improved shoulder stability  Progressed exercises to foam on floor  Pt was sufficiently challenged with this  Pt educated to attempt golf swing before next visit to assess ability to perform work activities  Plan: Continue per plan of care  Progress treatment as tolerated         Precautions: L shoulder labrum tear      Manuals                                                               Neuro Re-Ed             Shoulder taps on foam beam 3x10 3x10 3x10          Banded lateral hand walking on foam beam GTB 3x10 ea GTB 3x10 ea GTB 3 laps          Pallof press Silver 2x10 ea Silver 2x10 ea Silver 2x10 ea          Overhead KB standing march   GKB 3x10          1/2 Khmer get up w/ KB             Plank on bosu             Man resisted rhythmic stabilization  3x30\" 3# 3x30\" 3#          Body blade @ 90 deg FF  3x30\" 3x30\"                                                 Ther Ex             TB rows GTB 3x10 GTB 3x10 Blue 3x10          TB ER/IR GTB 3x10 GTB 3x10 Blue 3x10          TB 90/90 ER GTB 3x10 GTB 3x10 Blue 3x10          No monnies  GTB 3x10 Blue 3x10                                                              Ther Activity                                       Gait Training                                       Modalities                                            "

## 2023-06-15 ENCOUNTER — OFFICE VISIT (OUTPATIENT)
Dept: PHYSICAL THERAPY | Facility: CLINIC | Age: 23
End: 2023-06-15
Payer: COMMERCIAL

## 2023-06-15 DIAGNOSIS — S43.432A TEAR OF LEFT GLENOID LABRUM, INITIAL ENCOUNTER: Primary | ICD-10-CM

## 2023-06-15 PROCEDURE — 97112 NEUROMUSCULAR REEDUCATION: CPT

## 2023-06-15 NOTE — PROGRESS NOTES
"Daily Note     Today's date: 6/15/2023  Patient name: Luiza Ward  : 2000  MRN: [id number]  Referring provider: Martina Vega DO  Dx:   Encounter Diagnosis     ICD-10-CM    1  Tear of left glenoid labrum, initial encounter  S43 432A                      Subjective: Pt reports he took a couple golf swings  He had some very minor discomfort during swings, tolerable for ADLs and work activities  Pt did have some mechanical symptoms of clicking/popping/shifting with swinging  Pt is playing tournament this weekend so he will really be able to test his tolerance to activity  Objective: See treatment diary below      Assessment: Added one additional UE weight bearing on unstable surface challenge  Pt did very well with this  Pt tolerates stability exercises very well  Pt would be candidate for discharge to [facility] as stability will continue to improve with continued training and pt tolerates all interventions well and requires no cueing  Plan: Continue per plan of care  Progress treatment as tolerated  Decrease to 1x/week for next two weeks  Goal for discharge: swing golf club without pain or mechanical symptoms        Precautions: L shoulder labrum tear      Manuals 6/6 6/8 6/13 6/15                                                             Neuro Re-Ed             Shoulder taps on foam beam 3x10 3x10 3x10 3x10         Banded lateral hand walking on foam beam GTB 3x10 ea GTB 3x10 ea GTB 3 laps GTB 3 laps         Pallof press Silver 2x10 ea Silver 2x10 ea Silver 2x10 ea Silver 2x10 ea         Overhead KB standing march   GKB 3x10 GKB 3x10         1/2 Filipino get up w/ KB             Plank on bosu w/ tennis ball    3x30\"         Man resisted rhythmic stabilization  3x30\" 3# 3x30\" 3# 3x30\" 3#         Body blade @ 90 deg FF  3x30\" 3x30\" 3x30\"                                                Ther Ex             TB rows GTB 3x10 GTB 3x10 Blue 3x10 Blue 3x10         TB ER/IR GTB 3x10 GTB 3x10 Blue 3x10 " Blue 3x10         TB 90/90 ER GTB 3x10 GTB 3x10 Blue 3x10 Blue 3x10         No monnies  GTB 3x10 Blue 3x10 Blue 3x10                                                             Ther Activity                                       Gait Training                                       Modalities

## 2023-06-21 ENCOUNTER — OFFICE VISIT (OUTPATIENT)
Dept: PHYSICAL THERAPY | Facility: CLINIC | Age: 23
End: 2023-06-21
Payer: COMMERCIAL

## 2023-06-21 DIAGNOSIS — S43.432A TEAR OF LEFT GLENOID LABRUM, INITIAL ENCOUNTER: Primary | ICD-10-CM

## 2023-06-21 PROCEDURE — 97112 NEUROMUSCULAR REEDUCATION: CPT

## 2023-06-21 PROCEDURE — 97110 THERAPEUTIC EXERCISES: CPT

## 2023-06-21 NOTE — PROGRESS NOTES
"Daily Note     Today's date: 2023  Patient name: Shree Chan  : 2000  MRN: [id number]  Referring provider: Reny Corbin DO  Dx:   Encounter Diagnosis     ICD-10-CM    1  Tear of left glenoid labrum, initial encounter  S43 432A            Start Time: 945  Stop Time: 1030  Total time in clinic (min): 45 minutes    Subjective: Patient reports that swinging the golf clubs have been feeling more loose  He notes still having occasional popping and clicking in the shoulder  Objective: See treatment diary below    Assessment: Patient did well t/o therapy noting no increased pain, popping or clicking  Patient is challenged with the assigned TE listed below and fatigued  He required tactile cueing during TB exercises to focus scapular retraction and decrease anterior humeral translation  Steady progress thus far  Plan: Continue per plan of care  Progress treatment as tolerated  Decrease to 1x/week for next two weeks  Goal for discharge: swing golf club without pain or mechanical symptoms        Precautions: L shoulder labrum tear    Manuals 6/6 6/8 6/13 6/15 6/21                                                            Neuro Re-Ed             Shoulder taps on foam beam 3x10 3x10 3x10 3x10 3x10        Banded lateral hand walking on foam beam GTB 3x10 ea GTB 3x10 ea GTB 3 laps GTB 3 laps GTB 3 laps        Pallof press Silver 2x10 ea Silver 2x10 ea Silver 2x10 ea Silver 2x10 ea Silver 2x10 ea        Overhead KB standing march   GKB 3x10 GKB 3x10 GKB 3x10        1/2 Turkmen get up w/ KB             Plank on bosu w/ tennis ball    3x30\" 3x30\"        Man resisted rhythmic stabilization  3x30\" 3# 3x30\" 3# 3x30\" 3# 3x30\" 3#        Body blade @ 90 deg FF  3x30\" 3x30\" 3x30\" 3x30\"                                               Ther Ex             TB rows GTB 3x10 GTB 3x10 Blue 3x10 Blue 3x10 Blue 3x10        TB ER/IR GTB 3x10 GTB 3x10 Blue 3x10 Blue 3x10 Blue 3x10        TB 90/90 ER GTB 3x10 GTB 3x10 " Blue 3x10 Blue 3x10 Blue 3x10        No monnies  GTB 3x10 Blue 3x10 Blue 3x10 Blue 3x10                                                            Ther Activity                                       Gait Training                                       Modalities

## 2023-06-29 ENCOUNTER — OFFICE VISIT (OUTPATIENT)
Dept: PHYSICAL THERAPY | Facility: CLINIC | Age: 23
End: 2023-06-29
Payer: COMMERCIAL

## 2023-06-29 DIAGNOSIS — S43.432A TEAR OF LEFT GLENOID LABRUM, INITIAL ENCOUNTER: Primary | ICD-10-CM

## 2023-06-29 PROCEDURE — 97112 NEUROMUSCULAR REEDUCATION: CPT

## 2023-06-29 PROCEDURE — 97110 THERAPEUTIC EXERCISES: CPT

## 2023-06-29 NOTE — PROGRESS NOTES
"Daily Note     Today's date: 2023  Patient name: Mae Barron  : 2000  MRN: [id number]  Referring provider: Jimmy Saint, DO  Dx:   Encounter Diagnosis     ICD-10-CM    1  Tear of left glenoid labrum, initial encounter  S43 432A                       Subjective: Patient reports no mechanical symptoms since last visit, but he does have some soreness/discomfort on anterior aspect of shoulder  Objective: See treatment diary below    Assessment: Patient has achieved functional goal of being able to play a round of golf without mechanical symptoms  Pt is independent with HEP and appropriate for d/c at this time  Plan: D/c to HEP        Precautions: L shoulder labrum tear    Manuals 6/6 6/8 6/13 6/15 6/21 6/29                                                           Neuro Re-Ed             Shoulder taps on foam beam 3x10 3x10 3x10 3x10 3x10 3x10       Banded lateral hand walking on foam beam GTB 3x10 ea GTB 3x10 ea GTB 3 laps GTB 3 laps GTB 3 laps GTB 3 laps       Pallof press Silver 2x10 ea Silver 2x10 ea Silver 2x10 ea Silver 2x10 ea Silver 2x10 ea Silver 2x10 ea       Overhead KB standing march   GKB 3x10 GKB 3x10 GKB 3x10 GKB 3x10       1/2 Sami get up w/ KB             Plank on bosu w/ tennis ball    3x30\" 3x30\" 3x30\"       Man resisted rhythmic stabilization  3x30\" 3# 3x30\" 3# 3x30\" 3# 3x30\" 3# 6# 3x30\"       Body blade @ 90 deg FF  3x30\" 3x30\" 3x30\" 3x30\" 3x30\"                                              Ther Ex             TB rows GTB 3x10 GTB 3x10 Blue 3x10 Blue 3x10 Blue 3x10 Silver 3x10       TB ER/IR GTB 3x10 GTB 3x10 Blue 3x10 Blue 3x10 Blue 3x10 Silver 3x10       TB 90/90 ER GTB 3x10 GTB 3x10 Blue 3x10 Blue 3x10 Blue 3x10 Blue 3x10       No monnies  GTB 3x10 Blue 3x10 Blue 3x10 Blue 3x10 Blue 3x10                                                           Ther Activity                                       Gait Training                                       Modalities           "

## 2024-06-28 ENCOUNTER — HOSPITAL ENCOUNTER (EMERGENCY)
Facility: HOSPITAL | Age: 24
Discharge: HOME | End: 2024-06-28
Attending: EMERGENCY MEDICINE
Payer: COMMERCIAL

## 2024-06-28 ENCOUNTER — APPOINTMENT (EMERGENCY)
Dept: RADIOLOGY | Facility: HOSPITAL | Age: 24
End: 2024-06-28
Payer: COMMERCIAL

## 2024-06-28 ENCOUNTER — APPOINTMENT (EMERGENCY)
Dept: CT IMAGING | Facility: HOSPITAL | Age: 24
End: 2024-06-28
Payer: COMMERCIAL

## 2024-06-28 VITALS
BODY MASS INDEX: 23.63 KG/M2 | WEIGHT: 147.05 LBS | OXYGEN SATURATION: 100 % | TEMPERATURE: 98.2 F | SYSTOLIC BLOOD PRESSURE: 100 MMHG | HEIGHT: 66 IN | RESPIRATION RATE: 16 BRPM | HEART RATE: 61 BPM | DIASTOLIC BLOOD PRESSURE: 72 MMHG
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
## 2024-06-28 DIAGNOSIS — M25.512 ACUTE PAIN OF LEFT SHOULDER: ICD-10-CM

## 2024-06-28 DIAGNOSIS — M79.642 LEFT HAND PAIN: ICD-10-CM

## 2024-06-28 DIAGNOSIS — R51.9 HEADACHE: ICD-10-CM

## 2024-06-28 PROCEDURE — 99284 EMERGENCY DEPT VISIT MOD MDM: CPT

## 2024-06-28 PROCEDURE — 70450 CT HEAD/BRAIN W/O DYE: CPT

## 2024-06-28 PROCEDURE — 73030 X-RAY EXAM OF SHOULDER: CPT

## 2024-06-28 PROCEDURE — 96374 THER/PROPH/DIAG INJ IV PUSH: CPT

## 2024-06-28 PROCEDURE — 73130 X-RAY EXAM OF HAND: CPT

## 2024-06-28 RX ORDER — KETOROLAC TROMETHAMINE 30 MG/ML
15 INJECTION, SOLUTION INTRAMUSCULAR; INTRAVENOUS ONCE
Status: COMPLETED | OUTPATIENT
Start: 2024-06-28 | End: 2024-06-28

## 2024-06-28 RX ORDER — ACETAMINOPHEN 325 MG/1
975 TABLET ORAL ONCE
Status: COMPLETED | OUTPATIENT
Start: 2024-06-28 | End: 2024-06-28

## 2024-06-28 RX ADMIN — ACETAMINOPHEN 975 MG: 325 TABLET, FILM COATED ORAL at 12:31

## 2024-06-28 RX ADMIN — KETOROLAC TROMETHAMINE 15 MG: 30 INJECTION, SOLUTION INTRAMUSCULAR; INTRAVENOUS at 13:52
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
## 2024-06-28 NOTE — Clinical Note
Rafael Huber was seen and treated in our emergency department on 6/28/2024.                Diagnosis:     Rafael  may return to work on return date.    He may return on this date: 07/03/2024    May return sooner if symptoms improve.      If you have any questions or concerns, please don't hesitate to call.      Sarah Ruano PA-C    ______________________________           _______________          _______________  Hospital Representative                              Date                                Time

## 2024-06-28 NOTE — DISCHARGE INSTRUCTIONS
Rotate Tylenol and Motrin every 3 hours for best relief. For example, take Motrin then in 3 hours take Tylenol then 3 hours Motrin, repeat. Maximum Tylenol daily: 4,000 mg. Maximum ibuprofen daily: 3,600 mg.  Warm compresses for further relief.